# Patient Record
Sex: MALE | Race: BLACK OR AFRICAN AMERICAN | NOT HISPANIC OR LATINO | Employment: UNEMPLOYED | URBAN - METROPOLITAN AREA
[De-identification: names, ages, dates, MRNs, and addresses within clinical notes are randomized per-mention and may not be internally consistent; named-entity substitution may affect disease eponyms.]

---

## 2024-09-07 ENCOUNTER — HOSPITAL ENCOUNTER (INPATIENT)
Facility: HOSPITAL | Age: 29
End: 2024-09-07
Attending: STUDENT IN AN ORGANIZED HEALTH CARE EDUCATION/TRAINING PROGRAM | Admitting: STUDENT IN AN ORGANIZED HEALTH CARE EDUCATION/TRAINING PROGRAM
Payer: COMMERCIAL

## 2024-09-07 DIAGNOSIS — K50.914: ICD-10-CM

## 2024-09-07 DIAGNOSIS — K50.10 CROHN'S COLITIS, WITHOUT COMPLICATIONS (MULTI): Primary | ICD-10-CM

## 2024-09-07 DIAGNOSIS — K50.114 CROHN'S COLITIS, WITH ABSCESS (MULTI): ICD-10-CM

## 2024-09-07 LAB
BASOPHILS # BLD AUTO: 0.05 X10*3/UL (ref 0–0.1)
BASOPHILS NFR BLD AUTO: 0.4 %
EOSINOPHIL # BLD AUTO: 0.18 X10*3/UL (ref 0–0.7)
EOSINOPHIL NFR BLD AUTO: 1.5 %
ERYTHROCYTE [DISTWIDTH] IN BLOOD BY AUTOMATED COUNT: 11.8 % (ref 11.5–14.5)
HCT VFR BLD AUTO: 40.2 % (ref 41–52)
HGB BLD-MCNC: 14.2 G/DL (ref 13.5–17.5)
IMM GRANULOCYTES # BLD AUTO: 0.05 X10*3/UL (ref 0–0.7)
IMM GRANULOCYTES NFR BLD AUTO: 0.4 % (ref 0–0.9)
LYMPHOCYTES # BLD AUTO: 2.28 X10*3/UL (ref 1.2–4.8)
LYMPHOCYTES NFR BLD AUTO: 19.2 %
MCH RBC QN AUTO: 30.3 PG (ref 26–34)
MCHC RBC AUTO-ENTMCNC: 35.3 G/DL (ref 32–36)
MCV RBC AUTO: 86 FL (ref 80–100)
MONOCYTES # BLD AUTO: 1.35 X10*3/UL (ref 0.1–1)
MONOCYTES NFR BLD AUTO: 11.4 %
NEUTROPHILS # BLD AUTO: 7.94 X10*3/UL (ref 1.2–7.7)
NEUTROPHILS NFR BLD AUTO: 67.1 %
NRBC BLD-RTO: 0 /100 WBCS (ref 0–0)
PLATELET # BLD AUTO: 348 X10*3/UL (ref 150–450)
RBC # BLD AUTO: 4.68 X10*6/UL (ref 4.5–5.9)
WBC # BLD AUTO: 11.9 X10*3/UL (ref 4.4–11.3)

## 2024-09-07 PROCEDURE — 83690 ASSAY OF LIPASE: CPT | Performed by: EMERGENCY MEDICINE

## 2024-09-07 PROCEDURE — 99285 EMERGENCY DEPT VISIT HI MDM: CPT | Performed by: STUDENT IN AN ORGANIZED HEALTH CARE EDUCATION/TRAINING PROGRAM

## 2024-09-07 PROCEDURE — 86706 HEP B SURFACE ANTIBODY: CPT

## 2024-09-07 PROCEDURE — 85652 RBC SED RATE AUTOMATED: CPT

## 2024-09-07 PROCEDURE — 83690 ASSAY OF LIPASE: CPT | Performed by: STUDENT IN AN ORGANIZED HEALTH CARE EDUCATION/TRAINING PROGRAM

## 2024-09-07 PROCEDURE — 93010 ELECTROCARDIOGRAM REPORT: CPT | Performed by: STUDENT IN AN ORGANIZED HEALTH CARE EDUCATION/TRAINING PROGRAM

## 2024-09-07 PROCEDURE — 86803 HEPATITIS C AB TEST: CPT

## 2024-09-07 PROCEDURE — 96365 THER/PROPH/DIAG IV INF INIT: CPT

## 2024-09-07 PROCEDURE — 80053 COMPREHEN METABOLIC PANEL: CPT | Performed by: STUDENT IN AN ORGANIZED HEALTH CARE EDUCATION/TRAINING PROGRAM

## 2024-09-07 PROCEDURE — 86140 C-REACTIVE PROTEIN: CPT

## 2024-09-07 PROCEDURE — 85025 COMPLETE CBC W/AUTO DIFF WBC: CPT | Performed by: EMERGENCY MEDICINE

## 2024-09-07 PROCEDURE — 80053 COMPREHEN METABOLIC PANEL: CPT | Performed by: EMERGENCY MEDICINE

## 2024-09-07 PROCEDURE — 85025 COMPLETE CBC W/AUTO DIFF WBC: CPT | Performed by: STUDENT IN AN ORGANIZED HEALTH CARE EDUCATION/TRAINING PROGRAM

## 2024-09-07 PROCEDURE — 36415 COLL VENOUS BLD VENIPUNCTURE: CPT | Performed by: EMERGENCY MEDICINE

## 2024-09-07 PROCEDURE — 83735 ASSAY OF MAGNESIUM: CPT

## 2024-09-07 PROCEDURE — 99285 EMERGENCY DEPT VISIT HI MDM: CPT

## 2024-09-07 ASSESSMENT — COLUMBIA-SUICIDE SEVERITY RATING SCALE - C-SSRS
1. IN THE PAST MONTH, HAVE YOU WISHED YOU WERE DEAD OR WISHED YOU COULD GO TO SLEEP AND NOT WAKE UP?: NO
2. HAVE YOU ACTUALLY HAD ANY THOUGHTS OF KILLING YOURSELF?: NO
6. HAVE YOU EVER DONE ANYTHING, STARTED TO DO ANYTHING, OR PREPARED TO DO ANYTHING TO END YOUR LIFE?: NO

## 2024-09-07 ASSESSMENT — PAIN SCALES - GENERAL: PAINLEVEL_OUTOF10: 10 - WORST POSSIBLE PAIN

## 2024-09-07 ASSESSMENT — PAIN - FUNCTIONAL ASSESSMENT: PAIN_FUNCTIONAL_ASSESSMENT: 0-10

## 2024-09-07 ASSESSMENT — PAIN DESCRIPTION - LOCATION: LOCATION: ABDOMEN

## 2024-09-08 ENCOUNTER — APPOINTMENT (OUTPATIENT)
Dept: RADIOLOGY | Facility: HOSPITAL | Age: 29
End: 2024-09-08
Payer: COMMERCIAL

## 2024-09-08 ENCOUNTER — CLINICAL SUPPORT (OUTPATIENT)
Dept: EMERGENCY MEDICINE | Facility: HOSPITAL | Age: 29
End: 2024-09-08
Payer: COMMERCIAL

## 2024-09-08 VITALS
HEIGHT: 68 IN | DIASTOLIC BLOOD PRESSURE: 63 MMHG | OXYGEN SATURATION: 99 % | HEART RATE: 76 BPM | BODY MASS INDEX: 21.22 KG/M2 | WEIGHT: 140 LBS | SYSTOLIC BLOOD PRESSURE: 115 MMHG | RESPIRATION RATE: 18 BRPM | TEMPERATURE: 98.6 F

## 2024-09-08 PROBLEM — K50.10: Status: ACTIVE | Noted: 2024-09-08

## 2024-09-08 LAB
25(OH)D3 SERPL-MCNC: 22 NG/ML (ref 30–100)
ABO GROUP (TYPE) IN BLOOD: NORMAL
ALBUMIN SERPL BCP-MCNC: 3.6 G/DL (ref 3.4–5)
ALBUMIN SERPL BCP-MCNC: 3.9 G/DL (ref 3.4–5)
ALBUMIN SERPL BCP-MCNC: 4.2 G/DL (ref 3.4–5)
ALP SERPL-CCNC: 61 U/L (ref 33–120)
ALT SERPL W P-5'-P-CCNC: 5 U/L (ref 10–52)
ANION GAP SERPL CALC-SCNC: 14 MMOL/L (ref 10–20)
ANION GAP SERPL CALC-SCNC: 15 MMOL/L (ref 10–20)
ANION GAP SERPL CALC-SCNC: 18 MMOL/L (ref 10–20)
ANTIBODY SCREEN: NORMAL
AST SERPL W P-5'-P-CCNC: 12 U/L (ref 9–39)
ATRIAL RATE: 83 BPM
BACTERIA BLD CULT: NORMAL
BACTERIA BLD CULT: NORMAL
BASOPHILS # BLD AUTO: 0.04 X10*3/UL (ref 0–0.1)
BASOPHILS NFR BLD AUTO: 0.3 %
BILIRUB SERPL-MCNC: 0.5 MG/DL (ref 0–1.2)
BUN SERPL-MCNC: 5 MG/DL (ref 6–23)
BUN SERPL-MCNC: 6 MG/DL (ref 6–23)
BUN SERPL-MCNC: 7 MG/DL (ref 6–23)
C DIF TOX TCDA+TCDB STL QL NAA+PROBE: NOT DETECTED
CALCIUM SERPL-MCNC: 8.9 MG/DL (ref 8.6–10.6)
CALCIUM SERPL-MCNC: 9.6 MG/DL (ref 8.6–10.6)
CALCIUM SERPL-MCNC: 9.8 MG/DL (ref 8.6–10.6)
CHLORIDE SERPL-SCNC: 100 MMOL/L (ref 98–107)
CHLORIDE SERPL-SCNC: 101 MMOL/L (ref 98–107)
CHLORIDE SERPL-SCNC: 98 MMOL/L (ref 98–107)
CO2 SERPL-SCNC: 22 MMOL/L (ref 21–32)
CO2 SERPL-SCNC: 23 MMOL/L (ref 21–32)
CO2 SERPL-SCNC: 26 MMOL/L (ref 21–32)
CREAT SERPL-MCNC: 0.71 MG/DL (ref 0.5–1.3)
CREAT SERPL-MCNC: 0.78 MG/DL (ref 0.5–1.3)
CREAT SERPL-MCNC: 0.84 MG/DL (ref 0.5–1.3)
CRP SERPL-MCNC: 7.65 MG/DL
EGFRCR SERPLBLD CKD-EPI 2021: >90 ML/MIN/1.73M*2
EOSINOPHIL # BLD AUTO: 0.01 X10*3/UL (ref 0–0.7)
EOSINOPHIL NFR BLD AUTO: 0.1 %
ERYTHROCYTE [DISTWIDTH] IN BLOOD BY AUTOMATED COUNT: 11.7 % (ref 11.5–14.5)
ERYTHROCYTE [SEDIMENTATION RATE] IN BLOOD BY WESTERGREN METHOD: >130 MM/H (ref 0–15)
GLUCOSE BLD MANUAL STRIP-MCNC: 108 MG/DL (ref 74–99)
GLUCOSE BLD MANUAL STRIP-MCNC: 155 MG/DL (ref 74–99)
GLUCOSE SERPL-MCNC: 110 MG/DL (ref 74–99)
GLUCOSE SERPL-MCNC: 134 MG/DL (ref 74–99)
GLUCOSE SERPL-MCNC: 92 MG/DL (ref 74–99)
GRAM STN SPEC: ABNORMAL
GRAM STN SPEC: ABNORMAL
HBV SURFACE AB SER-ACNC: 43.6 MIU/ML
HBV SURFACE AG SERPL QL IA: NONREACTIVE
HCT VFR BLD AUTO: 33.5 % (ref 41–52)
HCV AB SER QL: NONREACTIVE
HGB BLD-MCNC: 11.9 G/DL (ref 13.5–17.5)
IMM GRANULOCYTES # BLD AUTO: 0.06 X10*3/UL (ref 0–0.7)
IMM GRANULOCYTES NFR BLD AUTO: 0.5 % (ref 0–0.9)
LIPASE SERPL-CCNC: 12 U/L (ref 9–82)
LYMPHOCYTES # BLD AUTO: 1.12 X10*3/UL (ref 1.2–4.8)
LYMPHOCYTES NFR BLD AUTO: 8.5 %
MAGNESIUM SERPL-MCNC: 1.78 MG/DL (ref 1.6–2.4)
MAGNESIUM SERPL-MCNC: 1.85 MG/DL (ref 1.6–2.4)
MAGNESIUM SERPL-MCNC: 2.05 MG/DL (ref 1.6–2.4)
MCH RBC QN AUTO: 29.8 PG (ref 26–34)
MCHC RBC AUTO-ENTMCNC: 35.5 G/DL (ref 32–36)
MCV RBC AUTO: 84 FL (ref 80–100)
MONOCYTES # BLD AUTO: 0.95 X10*3/UL (ref 0.1–1)
MONOCYTES NFR BLD AUTO: 7.2 %
NEUTROPHILS # BLD AUTO: 11.02 X10*3/UL (ref 1.2–7.7)
NEUTROPHILS NFR BLD AUTO: 83.4 %
NRBC BLD-RTO: 0 /100 WBCS (ref 0–0)
P AXIS: 76 DEGREES
P OFFSET: 214 MS
P ONSET: 171 MS
PHOSPHATE SERPL-MCNC: 3.4 MG/DL (ref 2.5–4.9)
PHOSPHATE SERPL-MCNC: 3.5 MG/DL (ref 2.5–4.9)
PLATELET # BLD AUTO: 309 X10*3/UL (ref 150–450)
POTASSIUM SERPL-SCNC: 3 MMOL/L (ref 3.5–5.3)
POTASSIUM SERPL-SCNC: 3 MMOL/L (ref 3.5–5.3)
POTASSIUM SERPL-SCNC: 3.2 MMOL/L (ref 3.5–5.3)
PR INTERVAL: 102 MS
PROT SERPL-MCNC: 9.3 G/DL (ref 6.4–8.2)
Q ONSET: 222 MS
QRS COUNT: 13 BEATS
QRS DURATION: 82 MS
QT INTERVAL: 388 MS
QTC CALCULATION(BAZETT): 455 MS
QTC FREDERICIA: 432 MS
R AXIS: 89 DEGREES
RBC # BLD AUTO: 3.99 X10*6/UL (ref 4.5–5.9)
RH FACTOR (ANTIGEN D): NORMAL
SODIUM SERPL-SCNC: 134 MMOL/L (ref 136–145)
SODIUM SERPL-SCNC: 136 MMOL/L (ref 136–145)
SODIUM SERPL-SCNC: 138 MMOL/L (ref 136–145)
T AXIS: -70 DEGREES
T OFFSET: 416 MS
VENTRICULAR RATE: 83 BPM
WBC # BLD AUTO: 13.2 X10*3/UL (ref 4.4–11.3)

## 2024-09-08 PROCEDURE — 96376 TX/PRO/DX INJ SAME DRUG ADON: CPT

## 2024-09-08 PROCEDURE — 74176 CT ABD & PELVIS W/O CONTRAST: CPT

## 2024-09-08 PROCEDURE — 80069 RENAL FUNCTION PANEL: CPT

## 2024-09-08 PROCEDURE — 2500000004 HC RX 250 GENERAL PHARMACY W/ HCPCS (ALT 636 FOR OP/ED)

## 2024-09-08 PROCEDURE — 2500000002 HC RX 250 W HCPCS SELF ADMINISTERED DRUGS (ALT 637 FOR MEDICARE OP, ALT 636 FOR OP/ED)

## 2024-09-08 PROCEDURE — 85025 COMPLETE CBC W/AUTO DIFF WBC: CPT

## 2024-09-08 PROCEDURE — 96375 TX/PRO/DX INJ NEW DRUG ADDON: CPT

## 2024-09-08 PROCEDURE — 36415 COLL VENOUS BLD VENIPUNCTURE: CPT

## 2024-09-08 PROCEDURE — 2500000002 HC RX 250 W HCPCS SELF ADMINISTERED DRUGS (ALT 637 FOR MEDICARE OP, ALT 636 FOR OP/ED): Mod: SE

## 2024-09-08 PROCEDURE — 82947 ASSAY GLUCOSE BLOOD QUANT: CPT

## 2024-09-08 PROCEDURE — 86850 RBC ANTIBODY SCREEN: CPT

## 2024-09-08 PROCEDURE — 93005 ELECTROCARDIOGRAM TRACING: CPT

## 2024-09-08 PROCEDURE — 83993 ASSAY FOR CALPROTECTIN FECAL: CPT

## 2024-09-08 PROCEDURE — 87340 HEPATITIS B SURFACE AG IA: CPT

## 2024-09-08 PROCEDURE — 83630 LACTOFERRIN FECAL (QUAL): CPT

## 2024-09-08 PROCEDURE — 2500000005 HC RX 250 GENERAL PHARMACY W/O HCPCS

## 2024-09-08 PROCEDURE — 87070 CULTURE OTHR SPECIMN AEROBIC: CPT

## 2024-09-08 PROCEDURE — 74176 CT ABD & PELVIS W/O CONTRAST: CPT | Performed by: RADIOLOGY

## 2024-09-08 PROCEDURE — 82306 VITAMIN D 25 HYDROXY: CPT

## 2024-09-08 PROCEDURE — 1100000001 HC PRIVATE ROOM DAILY

## 2024-09-08 PROCEDURE — 87506 IADNA-DNA/RNA PROBE TQ 6-11: CPT

## 2024-09-08 PROCEDURE — 87040 BLOOD CULTURE FOR BACTERIA: CPT

## 2024-09-08 PROCEDURE — 83735 ASSAY OF MAGNESIUM: CPT

## 2024-09-08 PROCEDURE — 2500000005 HC RX 250 GENERAL PHARMACY W/O HCPCS: Mod: SE

## 2024-09-08 PROCEDURE — 99223 1ST HOSP IP/OBS HIGH 75: CPT

## 2024-09-08 PROCEDURE — 86901 BLOOD TYPING SEROLOGIC RH(D): CPT

## 2024-09-08 PROCEDURE — 2500000001 HC RX 250 WO HCPCS SELF ADMINISTERED DRUGS (ALT 637 FOR MEDICARE OP)

## 2024-09-08 PROCEDURE — 87493 C DIFF AMPLIFIED PROBE: CPT

## 2024-09-08 PROCEDURE — 0D9Q0ZZ DRAINAGE OF ANUS, OPEN APPROACH: ICD-10-PCS

## 2024-09-08 PROCEDURE — 99255 IP/OBS CONSLTJ NEW/EST HI 80: CPT | Performed by: INTERNAL MEDICINE

## 2024-09-08 RX ORDER — ONDANSETRON HYDROCHLORIDE 2 MG/ML
4 INJECTION, SOLUTION INTRAVENOUS ONCE
Status: COMPLETED | OUTPATIENT
Start: 2024-09-08 | End: 2024-09-08

## 2024-09-08 RX ORDER — POTASSIUM CHLORIDE 14.9 MG/ML
20 INJECTION INTRAVENOUS
Status: DISCONTINUED | OUTPATIENT
Start: 2024-09-08 | End: 2024-09-08

## 2024-09-08 RX ORDER — ONDANSETRON HYDROCHLORIDE 2 MG/ML
4 INJECTION, SOLUTION INTRAVENOUS EVERY 4 HOURS PRN
Status: DISCONTINUED | OUTPATIENT
Start: 2024-09-08 | End: 2024-09-08

## 2024-09-08 RX ORDER — ONDANSETRON 4 MG/1
4 TABLET, FILM COATED ORAL EVERY 8 HOURS PRN
Status: DISCONTINUED | OUTPATIENT
Start: 2024-09-08 | End: 2024-09-08

## 2024-09-08 RX ORDER — CEFTRIAXONE 1 G/50ML
1 INJECTION, SOLUTION INTRAVENOUS ONCE
Status: COMPLETED | OUTPATIENT
Start: 2024-09-08 | End: 2024-09-08

## 2024-09-08 RX ORDER — PANTOPRAZOLE SODIUM 40 MG/10ML
80 INJECTION, POWDER, LYOPHILIZED, FOR SOLUTION INTRAVENOUS ONCE
Status: COMPLETED | OUTPATIENT
Start: 2024-09-08 | End: 2024-09-08

## 2024-09-08 RX ORDER — POTASSIUM CHLORIDE 1.5 G/1.58G
40 POWDER, FOR SOLUTION ORAL ONCE
Status: DISCONTINUED | OUTPATIENT
Start: 2024-09-08 | End: 2024-09-08

## 2024-09-08 RX ORDER — PANTOPRAZOLE SODIUM 40 MG/10ML
40 INJECTION, POWDER, LYOPHILIZED, FOR SOLUTION INTRAVENOUS
Status: DISCONTINUED | OUTPATIENT
Start: 2024-09-08 | End: 2024-09-08

## 2024-09-08 RX ORDER — POLYETHYLENE GLYCOL 3350, SODIUM CHLORIDE, SODIUM BICARBONATE, POTASSIUM CHLORIDE 420; 11.2; 5.72; 1.48 G/4L; G/4L; G/4L; G/4L
4000 POWDER, FOR SOLUTION ORAL ONCE
Status: COMPLETED | OUTPATIENT
Start: 2024-09-08 | End: 2024-09-08

## 2024-09-08 RX ORDER — METOCLOPRAMIDE HYDROCHLORIDE 5 MG/ML
10 INJECTION INTRAMUSCULAR; INTRAVENOUS ONCE
Status: COMPLETED | OUTPATIENT
Start: 2024-09-08 | End: 2024-09-08

## 2024-09-08 RX ORDER — INSULIN LISPRO 100 [IU]/ML
0-10 INJECTION, SOLUTION INTRAVENOUS; SUBCUTANEOUS
Status: DISCONTINUED | OUTPATIENT
Start: 2024-09-08 | End: 2024-09-08

## 2024-09-08 RX ORDER — POTASSIUM CHLORIDE 20 MEQ/1
40 TABLET, EXTENDED RELEASE ORAL ONCE
Status: COMPLETED | OUTPATIENT
Start: 2024-09-08 | End: 2024-09-08

## 2024-09-08 RX ORDER — ONDANSETRON HYDROCHLORIDE 2 MG/ML
4 INJECTION, SOLUTION INTRAVENOUS EVERY 6 HOURS PRN
Status: DISPENSED | OUTPATIENT
Start: 2024-09-08

## 2024-09-08 RX ORDER — PANTOPRAZOLE SODIUM 40 MG/10ML
40 INJECTION, POWDER, LYOPHILIZED, FOR SOLUTION INTRAVENOUS 2 TIMES DAILY
Status: DISPENSED | OUTPATIENT
Start: 2024-09-08

## 2024-09-08 RX ORDER — CEFTRIAXONE 1 G/50ML
1 INJECTION, SOLUTION INTRAVENOUS EVERY 24 HOURS
Status: DISPENSED | OUTPATIENT
Start: 2024-09-09

## 2024-09-08 RX ORDER — METRONIDAZOLE 500 MG/100ML
500 INJECTION, SOLUTION INTRAVENOUS EVERY 8 HOURS
Status: DISPENSED | OUTPATIENT
Start: 2024-09-08

## 2024-09-08 RX ORDER — POLYETHYLENE GLYCOL 3350, SODIUM CHLORIDE, SODIUM BICARBONATE, POTASSIUM CHLORIDE 420; 11.2; 5.72; 1.48 G/4L; G/4L; G/4L; G/4L
2000 POWDER, FOR SOLUTION ORAL ONCE AS NEEDED
Status: DISPENSED | OUTPATIENT
Start: 2024-09-08

## 2024-09-08 RX ORDER — POTASSIUM CHLORIDE 14.9 MG/ML
20 INJECTION INTRAVENOUS
Status: COMPLETED | OUTPATIENT
Start: 2024-09-08 | End: 2024-09-08

## 2024-09-08 RX ORDER — POLYETHYLENE GLYCOL 3350, SODIUM CHLORIDE, SODIUM BICARBONATE, POTASSIUM CHLORIDE 420; 11.2; 5.72; 1.48 G/4L; G/4L; G/4L; G/4L
4000 POWDER, FOR SOLUTION ORAL ONCE
Status: DISCONTINUED | OUTPATIENT
Start: 2024-09-08 | End: 2024-09-08

## 2024-09-08 RX ORDER — HYDROMORPHONE HYDROCHLORIDE 1 MG/ML
0.5 INJECTION, SOLUTION INTRAMUSCULAR; INTRAVENOUS; SUBCUTANEOUS ONCE
Status: COMPLETED | OUTPATIENT
Start: 2024-09-08 | End: 2024-09-08

## 2024-09-08 RX ORDER — MAGNESIUM SULFATE HEPTAHYDRATE 40 MG/ML
2 INJECTION, SOLUTION INTRAVENOUS ONCE
Status: COMPLETED | OUTPATIENT
Start: 2024-09-08 | End: 2024-09-08

## 2024-09-08 RX ORDER — POTASSIUM CHLORIDE 20 MEQ/1
20 TABLET, EXTENDED RELEASE ORAL ONCE
Status: DISCONTINUED | OUTPATIENT
Start: 2024-09-08 | End: 2024-09-08

## 2024-09-08 RX ORDER — LIDOCAINE HYDROCHLORIDE AND EPINEPHRINE 10; 10 MG/ML; UG/ML
20 INJECTION, SOLUTION INFILTRATION; PERINEURAL ONCE
Status: COMPLETED | OUTPATIENT
Start: 2024-09-08 | End: 2024-09-08

## 2024-09-08 RX ADMIN — POTASSIUM CHLORIDE 20 MEQ: 14.9 INJECTION, SOLUTION INTRAVENOUS at 12:01

## 2024-09-08 RX ADMIN — LIDOCAINE HYDROCHLORIDE,EPINEPHRINE BITARTRATE 20 ML: 10; .01 INJECTION, SOLUTION INFILTRATION; PERINEURAL at 03:57

## 2024-09-08 RX ADMIN — ONDANSETRON 4 MG: 2 INJECTION INTRAMUSCULAR; INTRAVENOUS at 01:30

## 2024-09-08 RX ADMIN — INSULIN LISPRO 2 UNITS: 100 INJECTION, SOLUTION INTRAVENOUS; SUBCUTANEOUS at 08:08

## 2024-09-08 RX ADMIN — HYDROMORPHONE HYDROCHLORIDE 0.5 MG: 1 INJECTION, SOLUTION INTRAMUSCULAR; INTRAVENOUS; SUBCUTANEOUS at 03:27

## 2024-09-08 RX ADMIN — METRONIDAZOLE 500 MG: 5 INJECTION, SOLUTION INTRAVENOUS at 06:38

## 2024-09-08 RX ADMIN — POLYETHYLENE GLYCOL 3350, SODIUM SULFATE ANHYDROUS, SODIUM BICARBONATE, SODIUM CHLORIDE, POTASSIUM CHLORIDE 4000 ML: 236; 22.74; 6.74; 5.86; 2.97 POWDER, FOR SOLUTION ORAL at 19:20

## 2024-09-08 RX ADMIN — HYDROMORPHONE HYDROCHLORIDE 0.5 MG: 1 INJECTION, SOLUTION INTRAMUSCULAR; INTRAVENOUS; SUBCUTANEOUS at 01:30

## 2024-09-08 RX ADMIN — POTASSIUM CHLORIDE 20 MEQ: 14.9 INJECTION, SOLUTION INTRAVENOUS at 09:58

## 2024-09-08 RX ADMIN — ONDANSETRON 4 MG: 2 INJECTION INTRAMUSCULAR; INTRAVENOUS at 03:27

## 2024-09-08 RX ADMIN — PANTOPRAZOLE SODIUM 80 MG: 40 INJECTION, POWDER, FOR SOLUTION INTRAVENOUS at 08:07

## 2024-09-08 RX ADMIN — METHYLPREDNISOLONE SODIUM SUCCINATE 20 MG: 125 INJECTION INTRAMUSCULAR; INTRAVENOUS at 06:47

## 2024-09-08 RX ADMIN — METRONIDAZOLE 500 MG: 5 INJECTION, SOLUTION INTRAVENOUS at 12:01

## 2024-09-08 RX ADMIN — METOCLOPRAMIDE 10 MG: 5 INJECTION, SOLUTION INTRAMUSCULAR; INTRAVENOUS at 06:47

## 2024-09-08 RX ADMIN — CEFTRIAXONE SODIUM 1 G: 1 INJECTION, SOLUTION INTRAVENOUS at 04:59

## 2024-09-08 RX ADMIN — METHYLPREDNISOLONE SODIUM SUCCINATE 20 MG: 125 INJECTION INTRAMUSCULAR; INTRAVENOUS at 21:40

## 2024-09-08 RX ADMIN — POTASSIUM CHLORIDE 40 MEQ: 1500 TABLET, EXTENDED RELEASE ORAL at 02:29

## 2024-09-08 RX ADMIN — MAGNESIUM SULFATE HEPTAHYDRATE 2 G: 40 INJECTION, SOLUTION INTRAVENOUS at 07:38

## 2024-09-08 RX ADMIN — ONDANSETRON 4 MG: 2 INJECTION INTRAMUSCULAR; INTRAVENOUS at 20:08

## 2024-09-08 RX ADMIN — PANTOPRAZOLE SODIUM 40 MG: 40 INJECTION, POWDER, FOR SOLUTION INTRAVENOUS at 20:08

## 2024-09-08 RX ADMIN — ONDANSETRON HYDROCHLORIDE 4 MG: 4 TABLET, FILM COATED ORAL at 13:06

## 2024-09-08 RX ADMIN — METRONIDAZOLE 500 MG: 5 INJECTION, SOLUTION INTRAVENOUS at 20:28

## 2024-09-08 RX ADMIN — SODIUM CHLORIDE, POTASSIUM CHLORIDE, SODIUM LACTATE AND CALCIUM CHLORIDE 1000 ML: 600; 310; 30; 20 INJECTION, SOLUTION INTRAVENOUS at 04:58

## 2024-09-08 RX ADMIN — METHYLPREDNISOLONE SODIUM SUCCINATE 20 MG: 125 INJECTION INTRAMUSCULAR; INTRAVENOUS at 13:06

## 2024-09-08 SDOH — SOCIAL STABILITY: SOCIAL INSECURITY: HAVE YOU HAD ANY THOUGHTS OF HARMING ANYONE ELSE?: NO

## 2024-09-08 SDOH — SOCIAL STABILITY: SOCIAL INSECURITY: ARE THERE ANY APPARENT SIGNS OF INJURIES/BEHAVIORS THAT COULD BE RELATED TO ABUSE/NEGLECT?: NO

## 2024-09-08 SDOH — SOCIAL STABILITY: SOCIAL INSECURITY: ARE YOU OR HAVE YOU BEEN THREATENED OR ABUSED PHYSICALLY, EMOTIONALLY, OR SEXUALLY BY ANYONE?: NO

## 2024-09-08 SDOH — SOCIAL STABILITY: SOCIAL INSECURITY: ABUSE: ADULT

## 2024-09-08 SDOH — SOCIAL STABILITY: SOCIAL INSECURITY: HAS ANYONE EVER THREATENED TO HURT YOUR FAMILY OR YOUR PETS?: NO

## 2024-09-08 SDOH — SOCIAL STABILITY: SOCIAL INSECURITY: WERE YOU ABLE TO COMPLETE ALL THE BEHAVIORAL HEALTH SCREENINGS?: YES

## 2024-09-08 SDOH — SOCIAL STABILITY: SOCIAL INSECURITY: DO YOU FEEL UNSAFE GOING BACK TO THE PLACE WHERE YOU ARE LIVING?: NO

## 2024-09-08 SDOH — ECONOMIC STABILITY: INCOME INSECURITY: HOW HARD IS IT FOR YOU TO PAY FOR THE VERY BASICS LIKE FOOD, HOUSING, MEDICAL CARE, AND HEATING?: HARD

## 2024-09-08 SDOH — HEALTH STABILITY: MENTAL HEALTH: HOW OFTEN DO YOU HAVE 6 OR MORE DRINKS ON ONE OCCASION?: NEVER

## 2024-09-08 SDOH — HEALTH STABILITY: MENTAL HEALTH: HOW OFTEN DO YOU HAVE A DRINK CONTAINING ALCOHOL?: NEVER

## 2024-09-08 SDOH — SOCIAL STABILITY: SOCIAL INSECURITY: DOES ANYONE TRY TO KEEP YOU FROM HAVING/CONTACTING OTHER FRIENDS OR DOING THINGS OUTSIDE YOUR HOME?: NO

## 2024-09-08 SDOH — ECONOMIC STABILITY: INCOME INSECURITY: IN THE LAST 12 MONTHS, WAS THERE A TIME WHEN YOU WERE NOT ABLE TO PAY THE MORTGAGE OR RENT ON TIME?: YES

## 2024-09-08 SDOH — SOCIAL STABILITY: SOCIAL INSECURITY: DO YOU FEEL ANYONE HAS EXPLOITED OR TAKEN ADVANTAGE OF YOU FINANCIALLY OR OF YOUR PERSONAL PROPERTY?: NO

## 2024-09-08 SDOH — SOCIAL STABILITY: SOCIAL INSECURITY: HAVE YOU HAD THOUGHTS OF HARMING ANYONE ELSE?: NO

## 2024-09-08 SDOH — HEALTH STABILITY: MENTAL HEALTH: HOW MANY STANDARD DRINKS CONTAINING ALCOHOL DO YOU HAVE ON A TYPICAL DAY?: PATIENT DOES NOT DRINK

## 2024-09-08 SDOH — ECONOMIC STABILITY: HOUSING INSECURITY: AT ANY TIME IN THE PAST 12 MONTHS, WERE YOU HOMELESS OR LIVING IN A SHELTER (INCLUDING NOW)?: NO

## 2024-09-08 SDOH — ECONOMIC STABILITY: HOUSING INSECURITY: IN THE PAST 12 MONTHS, HOW MANY TIMES HAVE YOU MOVED WHERE YOU WERE LIVING?: 2

## 2024-09-08 SDOH — ECONOMIC STABILITY: TRANSPORTATION INSECURITY
IN THE PAST 12 MONTHS, HAS THE LACK OF TRANSPORTATION KEPT YOU FROM MEDICAL APPOINTMENTS OR FROM GETTING MEDICATIONS?: NO

## 2024-09-08 SDOH — ECONOMIC STABILITY: TRANSPORTATION INSECURITY
IN THE PAST 12 MONTHS, HAS LACK OF TRANSPORTATION KEPT YOU FROM MEETINGS, WORK, OR FROM GETTING THINGS NEEDED FOR DAILY LIVING?: NO

## 2024-09-08 ASSESSMENT — ACTIVITIES OF DAILY LIVING (ADL)
PATIENT'S MEMORY ADEQUATE TO SAFELY COMPLETE DAILY ACTIVITIES?: YES
HEARING - LEFT EAR: FUNCTIONAL
DRESSING YOURSELF: INDEPENDENT
ADEQUATE_TO_COMPLETE_ADL: YES
BATHING: INDEPENDENT
HEARING - RIGHT EAR: FUNCTIONAL
TOILETING: INDEPENDENT
WALKS IN HOME: INDEPENDENT
FEEDING YOURSELF: INDEPENDENT
JUDGMENT_ADEQUATE_SAFELY_COMPLETE_DAILY_ACTIVITIES: YES
GROOMING: INDEPENDENT

## 2024-09-08 ASSESSMENT — COGNITIVE AND FUNCTIONAL STATUS - GENERAL
MOBILITY SCORE: 24
PATIENT BASELINE BEDBOUND: NO
MOBILITY SCORE: 24
DAILY ACTIVITIY SCORE: 24
DAILY ACTIVITIY SCORE: 24

## 2024-09-08 ASSESSMENT — LIFESTYLE VARIABLES
HOW MANY STANDARD DRINKS CONTAINING ALCOHOL DO YOU HAVE ON A TYPICAL DAY: PATIENT DOES NOT DRINK
HOW OFTEN DO YOU HAVE 6 OR MORE DRINKS ON ONE OCCASION: NEVER
SKIP TO QUESTIONS 9-10: 1
AUDIT-C TOTAL SCORE: 0
SKIP TO QUESTIONS 9-10: 1
HOW OFTEN DO YOU HAVE A DRINK CONTAINING ALCOHOL: NEVER
SKIP TO QUESTIONS 9-10: 1
AUDIT-C TOTAL SCORE: 0
HOW OFTEN DO YOU HAVE A DRINK CONTAINING ALCOHOL: NEVER
AUDIT-C TOTAL SCORE: 0
HOW OFTEN DO YOU HAVE 6 OR MORE DRINKS ON ONE OCCASION: NEVER
HOW MANY STANDARD DRINKS CONTAINING ALCOHOL DO YOU HAVE ON A TYPICAL DAY: PATIENT DOES NOT DRINK

## 2024-09-08 ASSESSMENT — PAIN SCALES - GENERAL
PAINLEVEL_OUTOF10: 9
PAINLEVEL_OUTOF10: 0 - NO PAIN
PAINLEVEL_OUTOF10: 9
PAINLEVEL_OUTOF10: 9
PAINLEVEL_OUTOF10: 0 - NO PAIN

## 2024-09-08 ASSESSMENT — PATIENT HEALTH QUESTIONNAIRE - PHQ9
2. FEELING DOWN, DEPRESSED OR HOPELESS: NOT AT ALL
2. FEELING DOWN, DEPRESSED OR HOPELESS: NOT AT ALL
SUM OF ALL RESPONSES TO PHQ9 QUESTIONS 1 & 2: 0
1. LITTLE INTEREST OR PLEASURE IN DOING THINGS: NOT AT ALL
SUM OF ALL RESPONSES TO PHQ9 QUESTIONS 1 & 2: 0
1. LITTLE INTEREST OR PLEASURE IN DOING THINGS: NOT AT ALL

## 2024-09-08 ASSESSMENT — PAIN - FUNCTIONAL ASSESSMENT: PAIN_FUNCTIONAL_ASSESSMENT: 0-10

## 2024-09-08 NOTE — ED NOTES
Orthostatic VS  Laying: HR 78. /83  Sitting: HR 92. /84  Standing: HR 93 /96       Mi Rosado RN  09/08/24 110

## 2024-09-08 NOTE — PROGRESS NOTES
Pharmacy Medication History Review    Dave Alfaro is a 29 y.o. male admitted for Crohn's colitis, without complications (Multi). Pharmacy reviewed the patient's [id number] medications and allergies for accuracy.    Medications ADDED:  None   Medications CHANGED:  None   Medications REMOVED:   None      The list below reflects the updated PTA list. Comments regarding how patient may be taking medications differently can be found in the Admit Orders Activity  Prior to Admission Medications   Prescriptions Last Dose Informant   adalimumab (Humira,CF, Pen) 40 mg/0.4 mL pen injector kit pen-injector  Self   Sig: Inject under the skin.      Facility-Administered Medications: None        The list below reflects the updated allergy list. Please review each documented allergy for additional clarification and justification.  Allergies  Reviewed by Xiomara Cintron on 9/8/2024        Severity Reactions Comments    Iodinated Contrast Media Not Specified Hives     Iodine Not Specified Hives, Swelling     Methotrexate Not Specified Unknown     Pramoxine-hc-chloroxylenol Not Specified Unknown     Red Dye Not Specified Hives     Salicylates Not Specified Other Pt. Reports causes bleeding issues    Sulfasalazine Low Rash, Swelling             Patient accepts M2B at discharge. Pharmacy has been updated to [facility].    Sources used to complete the med history include:  OARRS  Interview with Patient (Good Historian)  Care Everywhere reviewed      Below are additional concerns with the patient's PTA list:  Patient stated he has not HUMIRA in about 7 months.    Xiomara Cintron [facility]  Transitions of Care Technician  [facility]  Please reach out via Secure Chat for questions, or if no response call [employer] or [facility]

## 2024-09-08 NOTE — PROCEDURES
[facility]  DEPARTMENT OF SURGERY    PROCEDURE NOTE    PROCEDURE:  Incision and drainage of asuncion-anal abscess    INDICATION:  Asuncion-rectal abscess    PERFORMING PHYSICIAN:  Juanita Coffey MD    ASSISTING PHYSICIAN:  Flako Michael MD    CONSENT:  The procedure, risks, benefits and alternatives were discussed with the patient. He is in understanding and agrees with proceeding. Verbal and written consent were obtained.    ESTIMATED BLOOD LOSS:  1 cc    ANESTHESIA:  none    PROCEDURE DETAILS:  The patient was placed in the left lateral position. The right buttock was cleaned and draped in the usual fashion. The area was infiltrated with 4ccs of 1% lidocaine with epinephrine. A cruciate incision was made over the area of central fluctuance with expression of a moderate amount of thin purulent fluid.  A tissue culture was obtained. The area was probed and deloculated. The incision was dressed with 4x4s. The patient tolerated the procedure well.     COMPLICATIONS:  None    PATIENT CONDITION:  Tolerated well

## 2024-09-08 NOTE — CONSULTS
Inpatient consult to Gastroenterology  Consult performed by: Billy Coburn MD  Consult ordered by: Gladys Hopkins DO          [facility]   [facility]  INITIAL CONSULT NOTE     Source of Information: The source of the history was patient    Consult requested by: Service: Carli Team    Reason for Consult: nausea, vomiting, hematochezia    Admission Chief Complaint: nausea, vomiting, hematochezia      SUBJECTIVE     HPI: Dave Alfaro is a 29 y.o. male w/PMH of Crohn's disease c/b sacroiliitis previously on adalimumab (last dose about 7 months ago due to non-adherence), BPD, DELMIS, vitamin D deficiency who presents for nausea, emesis, diarrhea, and hematochezia. Gastroenterology has been consulted for evaluation of the same.    The patient states feeling well until experiencing asuncion-anal pain about one week ago and noticing several abscesses forming. Notes some chills but never measured his temperature. Additionally noticed increased joint pain at this time in his RT shoulder, hips, and knees.    Additionally he had sudden onset nausea yesterday with at first bilious emesis x 5 that eventually started to also contain specks of blood. Non-radiating LLQ pain also started around this time and was associated with diarrhea and BRBPR. He had one episode of BRBPR at home and one in the ED, both painless. Otherwise denies dysphagia, odynophagia, tenesmus, obstipation/constipation. The BRBPR is what prompted him to seek evaluation in the ED.    He denies NSAID, alcohol, and tobacco use.    The patient was first diagnosed with Crohn's disease in 2011 and previously followed with Dr. Ibarra but reports recent non-adherence due to some mistrust of hospitals and doctors in general without endorsing specific concern. He was originally managed with infliximab but developed antibodies. The patient was then transitioned to adalimumab in 2012. At times he was also on 6-MP and then  methotrexate, however, these were discontinued due to inefficacy. 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 Last IBD ED/hospitalization in 2020 for LLQ/nausea/emesis and URI symptoms. CT showed Circumferential mucosal wall thickening of the large bowel from the level of the splenic flexure to the sigmoid colon with large amount of colonic fecal material at the level of the rectosigmoid as well as some mild mesenteric lymphadenopathy. Last clinic visit with Dr. Ibarra 2/23/21 where he did not report significant GI symptoms. Plan was to continue adalimumab and evaluated disease activity with EGD/colonoscopy (does not appear to have undergone per chart review). Last documented colonoscopy 2014 which showed inflammation from hepatic flexure to cecum.    ROS: Complete review of systems obtained, negative unless otherwise indicated above.     Allergies   Allergen Reactions    Iodinated Contrast Media Hives    Iodine Hives and Swelling    Methotrexate Unknown    Pramoxine-Hc-Chloroxylenol Unknown    Red Dye Hives    Salicylates Other     Pt. Reports causes bleeding issues    Sulfasalazine Rash and Swelling     Past Medical History:   Diagnosis Date    Crohn's disease, unspecified, without complications (Multi)     Crohn's disease    Hemorrhage of anus and rectum     Hemorrhage of rectum and anus    Melena     Hematochezia    Other conditions influencing health status     Arthritis    Other conditions influencing health status     Arthritis Of Inflammatory Bowel Disease    Pain in unspecified joint     Joint pain    Personal history of diseases of the blood and blood-forming organs and certain disorders involving the immune mechanism     History of iron deficiency anemia    Personal history of other diseases of the musculoskeletal system and connective tissue     History of backache    Personal history of other specified conditions     History of dizziness    Personal history of other  specified conditions     History of abdominal pain    Sacroiliitis, not elsewhere classified (CMS-HCC)     Sacroiliitis    Unspecified abdominal hernia without obstruction or gangrene     Hernia     Past Surgical History:   Procedure Laterality Date    COLONOSCOPY  12/11/2013    Complete Colonoscopy    HERNIA REPAIR  01/17/2014    Inguinal Hernia Repair    KNEE ARTHROSCOPY W/ DEBRIDEMENT  01/17/2014    Knee Arthroscopy (Therapeutic)    LYMPH NODE BIOPSY  04/08/2014    Biopsy Lymph Node    OTHER SURGICAL HISTORY  01/17/2014    Colonoscopy (Fiberoptic) Forceps Biopsy     No family history on file.  Social History     Social History Narrative    Not on file     [unfilled]    Medications:  Scheduled medications  [START ON 9/9/2024] cefTRIAXone, 1 g, intravenous, q24h  insulin lispro, 0-10 Units, subcutaneous, TID  methylPREDNISolone sodium succinate (PF), 20 mg, intravenous, q8h  metroNIDAZOLE, 500 mg, intravenous, q8h  pantoprazole, 40 mg, intravenous, BID  potassium chloride, 20 mEq, intravenous, q2h      Continuous medications     PRN medications         EXAM     Vital signs:  Visit Vitals  /71   Pulse 77   Temp 36.8 °C (98.2 °F) (Temporal)   Resp (!) 22         Physical Exam  Vitals reviewed.   Constitutional:       General: He is not in acute distress.     Appearance: Normal appearance. He is not ill-appearing or toxic-appearing.   HENT:      Head: Normocephalic and atraumatic.      Nose: Nose normal.   Eyes:      General: No scleral icterus.     Conjunctiva/sclera: Conjunctivae normal.      Pupils: Pupils are equal, round, and reactive to light.   Cardiovascular:      Rate and Rhythm: Normal rate and regular rhythm.      Pulses: Normal pulses.   Pulmonary:      Effort: Pulmonary effort is normal. No respiratory distress.   Abdominal:      General: Abdomen is flat. There is no distension.      Palpations: Abdomen is soft. There is no mass.      Tenderness: There is no abdominal tenderness. There is no guarding  or rebound.   Skin:     General: Skin is warm and dry.      Capillary Refill: Capillary refill takes less than 2 seconds.   Neurological:      Mental Status: He is alert and oriented to person, place, and time. Mental status is at baseline.             DATA                                                                            Labs     Lab Results   Component Value Date    WBC 13.2 (H) 09/08/2024    WBC 11.9 (H) 09/07/2024    WBC 6.0 12/13/2022    HGB 11.9 (L) 09/08/2024    HGB 14.2 09/07/2024    HGB 13.8 12/13/2022    MCV 84 09/08/2024    MCV 86 09/07/2024    MCV 93 12/13/2022     09/08/2024     09/07/2024     12/13/2022       Lab Results   Component Value Date    GLUCOSE 110 (H) 09/08/2024    CALCIUM 9.6 09/08/2024     09/08/2024    K 3.0 (L) 09/08/2024    CO2 22 09/08/2024     09/08/2024    BUN 6 09/08/2024    CREATININE 0.71 09/08/2024       Lab Results   Component Value Date    ALT 5 (L) 09/07/2024    ALT 12 12/13/2022    ALT 10 03/03/2021    AST 12 09/07/2024    AST 16 12/13/2022    AST 15 03/03/2021    ALKPHOS 61 09/07/2024    ALKPHOS 45 12/13/2022    ALKPHOS 42 03/03/2021    BILITOT 0.5 09/07/2024    BILITOT 0.6 12/13/2022    BILITOT 0.6 03/03/2021                                                                                  Imaging             === 09/07/24 ===    CT ABDOMEN PELVIS WO IV CONTRAST    - Impression -  There is colonic wall thickening extending from the proximal  ascending colon to the distal descending colon with mild pericolonic  inflammatory stranding. Findings concerning for  infectious/inflammatory colitis. No pneumatosis or portal venous gas.  No evidence for free air as clinically question. There is asymmetric  soft tissue thickening along the right medial intergluteal fold with  question some subtle adjacent stranding. Evaluation is limited on  this unenhanced CT. Given patient's history of Crohn's disease  findings concerning for a fistulous tract  with developing phlegmonous  changes. Correlate with symptomatology and dedicated exam.                                                                           GI Procedures   Colonoscopy 2014:   Impression:            - The examined portion of the ileum was normal.                          Biopsied.                         - Inflammation was found from the hepatic flexure to                          the cecum secondary to Crohn's disease with colonic                          involvement. Biopsied.                         - Inflammation was found in the transverse colon.                          Biopsied.                         - The rectum, sigmoid colon, descending colon and                          splenic flexure are normal. Biopsied.    A. TERMINAL ILEUM, COLD BIOPSY:  -- SMALL BOWEL MUCOSA WITH LYMPHOID AGGREGATES, NO SIGNIFICANT PATHOLOGIC  FINDINGS.     B.  CECUM, COLD BIOPSY:  -- ULCER AND CHRONIC ACTIVE COLITIS.    Note: Focal epithelial atypia is present, and is favored as reactive in nature  due to prominent inflammatory background.     C.  ASCENDING COLON, COLD BIOPSY:  -- CHRONIC ACTIVE COLITIS.     D.  TRANSVERSE, PROXIMAL & DISTAL COLD BIOPSY:  -- CHRONIC ACTIVE COLITIS.     E.  DESCENDING COLON, COLD BIOPSY:  -- CHRONIC INACTIVE COLITIS.     F.  SIGMOID COLON, COLD BIOPSY:  -- CHRONIC INACTIVE COLITIS.     G.  RECTUM, COLD BIOPSY:    -- CHRONIC INACTIVE COLITIS.     ASSESSMENT / PLAN                  Assessment and Recommendations:   Dave Alfaro is a 29 y.o. male w/PMH of Crohn's disease c/b sacroiliitis previously on adalimumab (last dose about 7 months ago due to non-adherence), BPD, DELMIS, vitamin D deficiency who presents for nausea, emesis, diarrhea, and hematochezia. Gastroenterology has been consulted for evaluation of Crohn's disease flare.    The patient is currently afebrile and hemodynamically stable. He does have an acute on chronic anemia but has not required transfusion. His  symptoms are most consistent with a flare of his known Crohn's disease, likely precipitated by medication non-adherence due to poor compliance with follow-up. Stool pathogen workup negative. Hematemesis most likely related to a MW tear though possibly esophagitis, gastritis, PUD as well. His presentation is further complicated by worsening MSK and asuncion-anal disease that are likely related to his flare. Asuncion-anal abscesses now s/p ID by General Surgery with Colorectal Surgery service to follow.    #Hematemesis  #Hematochezia  #Asuncion-anal Ileocolonic Crohn's Disease, acute flare  #Acute on Chronic Normocytic Anemia    Recommendations:  - Trend CBC q12h, transfuse for Hgb < 7.0 or platelets < 50k or in the setting of hemodynamic instability in the setting of active hemorrhage.  - Will follow-up on fecal calprotectin  - Please order MRI A/P to evaluate for possible fistula in the setting of asuncion-anal disease  - Continue methylprednisolone 20 mg IV TID  - Continue antibiotics per Primary Team  - Clear liquid diet today  - Please make the patient NPO at [id number] 9/8/24   - Will plan for EGD and colonoscopy 9/9/24 to assess disease activity and sources of hematemesis/hematochezia; please order 4L of golytely for bowel prep to be administered with split dosing  - Agree with Colorectal Surgery consultation    For Bowel Prep:  -Clear liquid diet for the rest of today  -NPO at MN except for bowel prep  - Please administer 4L Golytely this evening starting at 5pm. 2L PM 9/8 and 2L AM 9/9.  - Patient must drink all 4 L prep. Prep tastes better cold, mixed with other clear/flavorful beverage such as crystal light or juice. Pt to drink 8 oz glass of Golytely mixture every 15 minutes until ALL liquid is gone.   - Can mix Golytley with ice, juice (non-red), and drink with straw.  - Rectal output should be CLEAR (appearance of water or urine).  - If the patient's BMs are not clear by 4 AM, nursing should contact on-call primary team  cross-cover to order 2 additional liters of Golytely.   - Pt must drink these 2 L by 6 AM, otherwise colonoscopy may need to be postponed    JAIRO per primary team.   ------------------------------------------------------------------------  Billy Coburn MD   Gastroenterology Fellow    After 5PM and on Weekends, please page on-call fellow.    Case discussed with on-call attending Dr. Vamshi Guillen and will be formally seen by GI consults attending Dr. Marco Hemphill 9/9/24.  Final recommendations pending attending attestation.

## 2024-09-08 NOTE — H&P
History Of Present Illness  Dave Alfaro is a 29 y.o. male with PMH Crohn's disease and arthritis presenting with a chief complaint of rectal bleeding.    On interview, patient notes a history of abdominal pain, nausea, vomiting, and bloody BMs that started in the last 4-5 days. Patient notes diarrhea that started a few days ago which gradually became bloody, pt had 2 bloody BMs prior to arrival to the ED. Abdominal pain is diffuse but most painful in LLQ, patient also endorses rectal pain. Notes pain is similar to previous Crohn's disease flares, follows with Dr. Ibarra outpatient but has not been able to receive Humira in 7-8 months after missing appointments. Patient reports he had a colonoscopy 2-3 years ago but last documentation indicates colonoscopy in 2014 per chart review. Patient also notes right sided chest pain that extends to his right shoulder and worsens with movement, patient believes pain is related to chronic untreated rotator cuff injury. Patient otherwise denies hemoptysis, hematemesis, recent fevers/chills, shortness of breath, urinary changes, dizziness, lightheadedness.    In the ED, patient was treated for suspected Crohn's flare. IV methylprednisolone 20mg q8 started, IV protonix 80mg given. 1L LR bolus given persistent diarrhea and fluid losses. 0.5mg IV Dilaudid for pain control. Patient had an additional bright red bloody BM in the ED (see media). CT A/P noted likely fistulous tract formation along right intergluteal fold. ACS was consulted for perirectal/perianal abscess noted on exam, performed I&D which relieved rectal pain. Patient started on ceftriaxone and metronidazole.     Past Medical History  He has a past medical history of Crohn's disease, unspecified, without complications (Multi), Hemorrhage of anus and rectum, Melena, Other conditions influencing health status, Other conditions influencing health status, Pain in unspecified joint, Personal history of diseases of the blood  and blood-forming organs and certain disorders involving the immune mechanism, Personal history of other diseases of the musculoskeletal system and connective tissue, Personal history of other specified conditions, Personal history of other specified conditions, Sacroiliitis, not elsewhere classified (CMS-HCC), and Unspecified abdominal hernia without obstruction or gangrene.    Surgical History  He has a past surgical history that includes Colonoscopy (12/11/2013); Knee arthroscopy w/ debridement (01/17/2014); Lymph node biopsy (04/08/2014); Hernia repair (01/17/2014); and Other surgical history (01/17/2014).     Social History  He has no history on file for tobacco use, alcohol use, and drug use.    Family History  No family history on file.     Allergies  Iodinated contrast media, Iodine, Methotrexate, Pramoxine-hc-chloroxylenol, Red dye, Salicylates, and Sulfasalazine    Last Recorded Vitals  Vitals:    09/08/24 0858 09/08/24 1100 09/08/24 1159 09/08/24 1200   BP: 115/72 127/83 (!) 142/96 123/71   BP Location:       Patient Position:       Pulse: 79 87 94 77   Resp: (!) 23 20 18 (!) 22   Temp:   36.8 °C (98.2 °F)    TempSrc:   Temporal    SpO2: 99% 99% 100% 99%   Weight:       Height:           Intake/Output last 3 Shifts:  No intake/output data recorded.    Physical Exam  General: Appears uncomfortable. Appears stated age. In no acute distress   HEENT: Normocephalic and atraumatic. EOMI, sclera non-icteric, MMM, no JVD  Cardiovascular: RRR, no r/m/g, right chest point tenderness  Pulmonary: Lungs clear to auscultation bilaterally. No wheezes/ rhonchi/ rales   GI: +BS, soft, tender to palpation most prominent in suprapubic area, no rebound or guarding  : Suprapubic pain  Extremities: No LE edema, limited RUE movement   Skin: warm and dry. No rashes or lesions   Neurologic: No focal neurologic deficit   Psych: Pleasant. Appropriate mood and affect     Labs    CBC  Results from last 72 hours   Lab Units  09/08/24  0702 09/07/24  2321   WBC AUTO x10*3/uL 13.2* 11.9*   HEMOGLOBIN g/dL 11.9* 14.2   HEMATOCRIT % 33.5* 40.2*   PLATELETS AUTO x10*3/uL 309 348      BMP  Results from last 72 hours   Lab Units 09/08/24  0523 09/07/24  2321   SODIUM mmol/L 138 136   POTASSIUM mmol/L 3.0* 3.2*   CHLORIDE mmol/L 101 98   BUN mg/dL 6 5*   CREATININE mg/dL 0.71 0.84   MAGNESIUM mg/dL 1.78 1.85   PHOSPHORUS mg/dL 3.5  --      LFTS  Results from last 72 hours   Lab Units 09/07/24  2321   ALT U/L 5*   AST U/L 12   ALK PHOS U/L 61   BILIRUBIN TOTAL mg/dL 0.5     Blood cultures 9/8/24: pending  Vitamin D: 22 (low)  Calprotectin: pending  Lactoferrin: pending  CRP: 7.65  ESR: >130    Imaging  CT abdomen pelvis wo IV contrast    Result Date: 9/8/2024  There is colonic wall thickening extending from the proximal ascending colon to the distal descending colon with mild pericolonic inflammatory stranding. Findings concerning for infectious/inflammatory colitis. No pneumatosis or portal venous gas. No evidence for free air as clinically question. There is asymmetric soft tissue thickening along the right medial intergluteal fold with question some subtle adjacent stranding. Evaluation is limited on this unenhanced CT. Given patient's history of Crohn's disease findings concerning for a fistulous tract with developing phlegmonous changes. Correlate with symptomatology and dedicated exam.   Additional findings as described above.   MACRO: None   Signed by: Lucía Vasquez 9/8/2024 2:33 AM Dictation workstation:   [id number]      Medications   Scheduled Medications  [START ON 9/9/2024] cefTRIAXone, 1 g, intravenous, q24h  insulin lispro, 0-10 Units, subcutaneous, TID  methylPREDNISolone sodium succinate (PF), 20 mg, intravenous, q8h  metroNIDAZOLE, 500 mg, intravenous, q8h  pantoprazole, 40 mg, intravenous, BID  potassium chloride, 20 mEq, intravenous, q2h     Continuous Medications    PRN Medications  PRN medications: ondansetron           Assessment/Plan     Dave Alfaro is a 29 y.o. male with PMH Crohn's disease and arthritis presenting with a chief complaint of rectal bleeding. High suspicion for Crohn's flare given similar pain as in prior flares and absence of biologic treatment for 7-8 months. Hemodynamically stable in ED but will continue to watch closely. Hb drop from 14.2 to 11.9, will trend with next morning labs. Pt continued to have bright red bloody BM in ED. Patient placed on steroids and IV PPI. CT noted colonic wall thickening concerning for active  GI consulted to consider possible endoscopic evaluation, may need to bowel prep tonight pending recs. Colorectal surgery following for abscess drainage, will continue CTX and flagyl.    Brief Plan 9/8/24:  - Consult GI for suspected Crohn's flare  - Continue IV methylpred 20mg q8  - Start protonix 40mg BID  - Continue CTX + flagyl  - Evening RFP/Mg    #LGIB  #Crohn's disease  - 4-5 day history of diarrhea, BM became bright red yesterdya which prompted ED visit  - Suspect LGIB likely from Crohn's disease flare  - Pt known to develop rectal bleeding and pain with flares  - Last Humira 7-8 months ago  - Elevated CRP and ESR  - CT inflammatory findings likely from active Crohn's  Plan:  - GI consulted, appreciate recs  - Continue IV methylpred 20mg q8  - Statr IV protonix 40mg BID  - 2x blood cultures  - Stool PCR, c diff, fecal calprotectin  - Hep B/C + T spot for biologic re initiation as outpatient  - Daily CBC  - Active T&S, patient consented    #Perirectal/anal abscesses  - CT found possible fistulous tract formation with perirectal/perianal fluid collections  - ACS performed I&D in ED  - Pt reports pain relief after procedure  - CTX and flagyl started  Plan:  - Colorectal surgery following, appreciate recs  - Continue CTX and Flagyl  - F/u tissue cultures    #Right chest pain  #Right shoulder pain  - Low concern for ACS based on EKG  - Pain is reproducible with movement of right  shoulder  - Possibly arthritic extraintestinal manifestation of Crohn's disease  - No shoulder MRI per chart review  Plan:  - PRN EKG for ACS symptoms  - Consider trops if pain is persistent  - Outpatient follow up with orthopedics    #Hypokalemia  - K 3.2 in ED, given 40 mEq but K still low 3.0 on recheck  - Likely 2/2 to diarrhea and GI losses  - Possible EKG changes, T wave inversions noted on EKG but may be chronic  Plan:  - Ordered additional 40 mEq IV potassium  - Daily BMP  - Continue to replete as needed  - Possible bowel prep tonight, if so need to aggressively replete    F: PRN  E: Replete for K>4, Mg>2, phos>3  N: NPO Diet; Effective midnight  Adult diet Clear Liquid   A: 2 pIVs    Oxygen: none  Abx: cefTRIAXone - 1 gram/50 mL  metroNIDAZOLE - 500 mg/100 mL   Gtts: none    DVT ppx: holding in setting of GI bleed  GI ppx: protonix  Bowel regimen: none    Code Status: Full Code (confirmed on admission)  Surrogate Medical Decision-maker: Elda Perdomo (Parent) [phone]    Albin Alvarado MD  Internal Medicine PGY-1

## 2024-09-08 NOTE — HOSPITAL COURSE
Dave Alfaro is a 29 y.o. male with PMH Crohn's disease and arthritis presenting with a chief complaint of rectal bleeding.     In the ED, patient was treated for suspected Crohn's flare. IV methylprednisolone 20mg q8 started, IV protonix 80mg given. 1L LR bolus given persistent diarrhea and fluid losses. 0.5mg IV Dilaudid for pain control. Patient had an additional bright red bloody BM in the ED. CT A/P noted likely fistulous tract formation along right intergluteal fold. ACS was consulted for perirectal/perianal abscess noted on exam, performed I&D which relieved rectal pain. Patient started on ceftriaxone and metronidazole.     EGD and colonoscopy completed 9/9/2024. EGD showed normal appearance of esophagus, mild patchy erythematous mucosa in the antrum, and normal appearance of duodenum. Biopsy obtained to rule out H. Pylori. Colonoscopy showed mild erythematous mucosa in the terminal ileum, consistent with Crohn's disease. Cobblestone, edematous, erythematous, friable, ulcerated mucosa in the cecum, ascending colon, transverse colon, descending colon and sigmoid colon, consistent with Crohn's disease. Biopsy obtained. Medium (grade 2) hemorrhoids present. Pelvic MRI wo contrast showed asymmetric thickening of the right intergluteal cleft from the posterior rectal canal to the skin with linear T2 hyperintensity/diffusion restriction suggestive of fistulous connection.    At the time of discharge, patient reported symptomatic improvement after 72 hours of IV methylpred 20 mg TID. He notes that he is still having about 5 Bms per day, but these are no longer bloody and his abdominal pain and cramping has resolved. He was able to tolerate a regular diet without symptom exacerbation. Rectal abscess with no pain since I&D, wound cultures only yielded gram positive cocci with rare mixed skin microorganisms.    Patient referred to follow up with Dr. Ibarra to plan for restarting Humira and follow up of biopsy results.  He also has follow up scheduled with colorectal surgery to discuss fistula management. Discharged on prednisone taper 40 mg PO daily, decrease by 5 mg every week. Prescribed 4-day course of Cipro/flagyl to complete 7 days of antibiotics for rectal abscess.

## 2024-09-08 NOTE — ED PROVIDER NOTES
History of Present Illness     History provided by: Patient  Limitations to History: None  External Records Reviewed with Brief Summary: None    HPI:  Dave Alfaro is a 29 y.o. male past medical history of Crohn's with extraintestinal manifestations of arthritis who presents today for abdominal pain. Patient states that he has been having more abdominal pain for the last 4 to 5 days, however, noted that he had 2 bloody bowel movements prior to arrival today. He does also endorse that he feels like he has a significant amount of rectal pain, which is not usual for him. He denies any fevers or chills, denies any diarrhea prior to this. He does endorse diffuse abdominal pain as well as nausea and vomiting with this. He denies any body aches or chills. Denies any known sick contacts.  Denies any numbness weakness tingling in his arms or legs. Denies any loss of bowel or bladder, denies any dysuria.    Physical Exam   Triage vitals:  T 37 °C (98.6 °F)  HR 91  /84  RR 18  O2 96 % None (Room air)    Physical Exam  Vitals and nursing note reviewed.   Constitutional:       General: He is in acute distress.      Appearance: Normal appearance. He is normal weight. He is not ill-appearing or diaphoretic.   HENT:      Head: Normocephalic and atraumatic.      Mouth/Throat:      Mouth: Mucous membranes are moist.      Pharynx: No oropharyngeal exudate or posterior oropharyngeal erythema.   Eyes:      General: No scleral icterus.     Extraocular Movements: Extraocular movements intact.      Pupils: Pupils are equal, round, and reactive to light.   Cardiovascular:      Rate and Rhythm: Normal rate and regular rhythm.      Pulses: Normal pulses.      Heart sounds: Normal heart sounds. No murmur heard.     No gallop.   Pulmonary:      Effort: Pulmonary effort is normal. No respiratory distress.      Breath sounds: Normal breath sounds. No stridor. No wheezing, rhonchi or rales.   Abdominal:      General: Abdomen is flat.  Bowel sounds are normal. There is no distension.      Palpations: Abdomen is soft. There is no mass.      Tenderness: There is abdominal tenderness in the periumbilical area, suprapubic area and left lower quadrant. There is no guarding or rebound. Negative signs include Ramos's sign and McBurney's sign.      Hernia: No hernia is present.   Genitourinary:     Comments: External hemorrhoid present, internal rectal exam deferred by patient, 3 cm in diameter palpable area of fluctuance in the right buttock near gluteal cleft  Musculoskeletal:         General: No swelling, deformity or signs of injury. Normal range of motion.      Cervical back: Normal range of motion and neck supple. No tenderness.   Skin:     General: Skin is warm and dry.      Capillary Refill: Capillary refill takes less than 2 seconds.      Coloration: Skin is not pale.      Findings: No erythema, lesion or rash.   Neurological:      General: No focal deficit present.      Mental Status: He is alert and oriented to person, place, and time. Mental status is at baseline.      Cranial Nerves: No cranial nerve deficit.      Motor: No weakness.   Psychiatric:         Mood and Affect: Mood normal.         Behavior: Behavior normal.       Medical Decision Making & ED Course   Medical Decision Makin y.o. male past medical history of Crohn's with extra intestinal manifestation of arthritis who presents today for abdominal pain and rectal pain. Given the fact the patient has not been able to take his Humira, my concern is that the patient is having a Crohn's flare.  Additionally, there are other concerns of possible Crohn's complications such as fistula or ulceration which may require additional evaluation and treatment.  I will give him fluids Zofran as well as 0.5 of Dilaudid and get basic labs on him.  I will also get a CT of his abdomen and pelvis without contrast.  While would prefer to do it with contrast, he does have an allergy to contrast  media, so feel that the patient would be appropriate for a CT without contrast due to this reason. Patient CT did demonstrate fluid collection in his right gluteal cheek with some extension that extended from his rectum concerning for possible enterocutaneous fistula. Did also demonstrate evidence of inflammatory changes around the colon suggestive of colitis. Given the fact the patient has not been on his Humira, this is most consistent with colitis secondary to Crohn's, especially with CRP bump.  Given the fact that the patient is a Crohn's patient, we will contact ACS/colorectal for evaluation and I&D of his perianal abscess. They were able to perform this, they recommended IV antibiotic treatment with ceftriaxone and metronidazole, which we will provide here in the emergency department. Patient did have a white count as well as an elevated CRP, which is suggestive of an inflammatory process. Given the fact the patient is having bloody bowel movements, we will also admit him to the GI service for further treatment and evaluation of his Crohn's flare as well as modification of his antibiotic treatment as necessary. Patient was accepted by the admissions coordinator to the GI service. I did also message the GI fellow, however, she did not respond.  ----      Differential diagnoses considered include but are not limited to: Crohn's colitis, diverticulitis, enterocutaneous fistula, peptic ulcer     Social Determinants of Health which Significantly Impact Care: None identified     EKG Independent Interpretation:  EKG shows a normal rate and rhythm, normal axis, normal intervals.  ST depressions in leads II 3 and 4 as well as V4 and 5, not clearly seen in most recent EKG, however, was from a number of years ago s    Independent Result Review and Interpretation: Relevant laboratory and radiographic results were reviewed and independently interpreted by myself.  As necessary, they are commented on in the ED  Course.    Chronic conditions affecting the patient's care: As documented above in MDM    The patient was discussed with the following consultants/services:  ACS, GI, admissions [place] excepted the patient    Care Considerations: As documented above in [facility]    ED Course:  Diagnoses as of 09/08/24 0758   Crohn's colitis, without complications (Multi)     Disposition   As a result of the work-up, the patient was discharged home.  he was informed of his diagnosis and instructed to come back with any concerns or worsening of condition.  he and was agreeable to the plan as discussed above.  he was given the opportunity to ask questions.  All of the patient's questions were answered.    Procedures   Procedures    Patient seen and discussed with ED attending physician.    Manuel Hickey MD  Emergency Medicine       Manuel Hickey MD  Resident  09/08/24 0806       Ryan Sheridan MD  09/08/24 0814

## 2024-09-08 NOTE — CONSULTS
[facility]  ACUTE CARE SURGERY - HISTORY AND PHYSICAL / CONSULT    Patient Name: Dave Alfaro  MRN: [id number]  Admit Date: 907  : 1995  AGE: 29 y.o.   GENDER: male  Inpatient consult to Acute Care Surgery  Consult performed by: Juanita Coffey MD  Consult ordered by: Ryan Sheridan MD  Reason for consult: Crohn's flare, perirectal abscess vs fistula      ==============================================================================  TODAY'S ASSESSMENT AND PLAN OF CARE:  Dave Alfaro is a 29 y.o. year old male patient with a PMH of Crohns and arthritis presenting today for LLQ pain, nausea and vomiting and rectal bleeding. Not currently taking biologic medications for his Crohn's disease, CT AP consistent with active flare of ascending through distal descending colon. Also noted to have soft tissue inflammation of R medical buttock. Examination demonstrating area of induration with central area of fluctuance concerning for perianal abscess. Leukocytosis on labs to 11.9. Area drained at bedside, see procedure note for additional details.     Recommendations:  - bedside drainage of perirectal abscess  - f/up culture results  - ceftriaxone and flagyl  - GI consult  - medical admission for management of Crohn's flare  - will defer remainder of management to Colorectal and primary teams    Plans discussed with senior resident Dr. Flako Michael PGY5 and attending physician Dr. Weathers.    Juanita Coffey MD  Acute Care Surgery     ==============================================================================  CHIEF COMPLAINT/REASON FOR CONSULT:  Dave Alfaro is a 29 y.o. year old male patient with a PMH of Crohns and arthritis presenting today for LLQ pain, nausea and vomiting and rectal bleeding. Patient has a hx of Crohn's disease (dx in ) most recently been managed on Humira. His last dose of Humira was 7-8 mos ago, states that he ran out and has missed his past  several GI appts. He follows with Dr. Ibarra for his GI. States that he has been feeling unwell for the past several days, with 2-3 episodes of yellow-green diarrhea daily, chills, nausea, emesis and LLQ pain. States that the pain feels similar to previous Crohn's flares. When having a bowel movement in the evening of 9/7 noted blood in the toilet with his BM and with wiping. Thinks his last colonoscopy was 2-3 years ago, is unsure of exact timing. Has not had a flare of his Crohn's in 2-3 years. Presented to the emergency department for further evaluation.    In the ED, he is afebrile, nontachycardic, normotensive, saturating well on room air. Labs notable for WBC 14.2 and CRP elevated to 7.65. CT scan of the abdomen and pelvis demonstrating colonic wall thickening from the ascending colon to distal descending colon as well as soft tissue thickening on the R medical intergluteal fold concerning for possible abscess vs fistula tract. Surgery consulted for additional recommendations.     PAST MEDICAL HISTORY:   PMH:   Past Medical History:   Diagnosis Date    Crohn's disease, unspecified, without complications (Multi)     Crohn's disease    Hemorrhage of anus and rectum     Hemorrhage of rectum and anus    Melena     Hematochezia    Other conditions influencing health status     Arthritis    Other conditions influencing health status     Arthritis Of Inflammatory Bowel Disease    Pain in unspecified joint     Joint pain    Personal history of diseases of the blood and blood-forming organs and certain disorders involving the immune mechanism     History of iron deficiency anemia    Personal history of other diseases of the musculoskeletal system and connective tissue     History of backache    Personal history of other specified conditions     History of dizziness    Personal history of other specified conditions     History of abdominal pain    Sacroiliitis, not elsewhere classified (CMS-HCC)     Sacroiliitis     Unspecified abdominal hernia without obstruction or gangrene     Hernia       PSH:   Past Surgical History:   Procedure Laterality Date    COLONOSCOPY  12/11/2013    Complete Colonoscopy    HERNIA REPAIR  01/17/2014    Inguinal Hernia Repair    KNEE ARTHROSCOPY W/ DEBRIDEMENT  01/17/2014    Knee Arthroscopy (Therapeutic)    LYMPH NODE BIOPSY  04/08/2014    Biopsy Lymph Node    OTHER SURGICAL HISTORY  01/17/2014    Colonoscopy (Fiberoptic) Forceps Biopsy     FH:   No family history on file.  SOCIAL HISTORY:    Smoking:    Social History     Tobacco Use   Smoking Status Not on file   Smokeless Tobacco Not on file       Alcohol:    Social History     Substance and Sexual Activity   Alcohol Use Not on file       MEDICATIONS:   Prior to Admission medications    Medication Sig Start Date End Date Taking? Authorizing Provider   adalimumab (Humira,CF, Pen) 40 mg/0.4 mL pen injector kit pen-injector Inject under the skin. 8/16/19   Historical Provider, MD     ALLERGIES:   Allergies   Allergen Reactions    Iodinated Contrast Media Hives    Iodine Hives and Swelling    Methotrexate Unknown    Pramoxine-Hc-Chloroxylenol Unknown    Red Dye Hives    Salicylates Other     Pt. Reports causes bleeding issues    Sulfasalazine Rash and Swelling       REVIEW OF SYSTEMS:  A 10 point ROS was conducted, pertinent positives per HPI.   PHYSICAL EXAM:  Physical Exam  Neurological: Awake, alert, conversive  Respiratory/Thorax: even, unlabored  Gastrointestinal: soft, nondistended, mildly tender to palpation int he left upper and left lower quadrants, no rebound or guarding  Rectal: refused  Skin: warm, dry, right medical buttock with area of induration with central area of fluctuance, small amount of purulent drainage noted, tender to palpation  Musculoskeletal: BANEGAS  Eyes: non-icteric  Extremities: no edema   Psychological: appropriate mood/affect    IMAGING SUMMARY:    CT abdomen pelvis wo IV contrast   Final Result   There is colonic wall  thickening extending from the proximal   ascending colon to the distal descending colon with mild pericolonic   inflammatory stranding. Findings concerning for   infectious/inflammatory colitis. No pneumatosis or portal venous gas.   No evidence for free air as clinically question. There is asymmetric   soft tissue thickening along the right medial intergluteal fold with   question some subtle adjacent stranding. Evaluation is limited on   this unenhanced CT. Given patient's history of Crohn's disease   findings concerning for a fistulous tract with developing phlegmonous   changes. Correlate with symptomatology and dedicated exam.        Additional findings as described above.        MACRO:   None        Signed by: Lucía Vasquez 9/8/2024 2:33 AM   Dictation workstation:   [id number]            LABS:  Results from last 7 days   Lab Units 09/07/24  2321   WBC AUTO x10*3/uL 11.9*   HEMOGLOBIN g/dL 14.2   HEMATOCRIT % 40.2*   PLATELETS AUTO x10*3/uL 348   NEUTROS PCT AUTO % 67.1   LYMPHS PCT AUTO % 19.2   MONOS PCT AUTO % 11.4   EOS PCT AUTO % 1.5         Results from last 7 days   Lab Units 09/07/24  2321   SODIUM mmol/L 136   POTASSIUM mmol/L 3.2*   CHLORIDE mmol/L 98   CO2 mmol/L 26   BUN mg/dL 5*   CREATININE mg/dL 0.84   CALCIUM mg/dL 9.8   PROTEIN TOTAL g/dL 9.3*   BILIRUBIN TOTAL mg/dL 0.5   ALK PHOS U/L 61   ALT U/L 5*   AST U/L 12   GLUCOSE mg/dL 92     Results from last 7 days   Lab Units 09/07/24  2321   BILIRUBIN TOTAL mg/dL 0.5             I have reviewed all laboratory and imaging results ordered/pertinent for this encounter.    All other review of systems negative, except as noted in HPI

## 2024-09-08 NOTE — CONSULTS
Reason For Consult  Crohn's, ? Perirectal abscess vs fistula    History Of Present Illness  Dave Alfaro is a 29 y.o. year old male patient with a PMH of Crohns and arthritis presenting today for LLQ pain, nausea and vomiting and rectal bleeding. Patient has a hx of Crohn's disease (dx in 2011) most recently been managed on Humira. His last dose of Humira was 7-8 mos ago, states that he ran out and has missed his past several GI appts. He follows with Dr. Ibarra for his GI. States that he has been feeling unwell for the past several days, with 2-3 episodes of yellow-green diarrhea daily, chills, nausea, emesis and LLQ pain. States that the pain feels similar to previous Crohn's flares. When having a bowel movement in the evening of 9/7 noted blood in the toilet with his BM and with wiping. Thinks his last colonoscopy was 2-3 years ago, is unsure of exact timing. Has not had a flare of his Crohn's in 2-3 years. Presented to the emergency department for further evaluation.     In the ED, he is afebrile, nontachycardic, normotensive, saturating well on room air. Labs notable for WBC 14.2 and CRP elevated to 7.65. CT scan of the abdomen and pelvis demonstrating colonic wall thickening from the ascending colon to distal descending colon as well as soft tissue thickening on the R medical intergluteal fold concerning for possible abscess vs fistula tract. Surgery consulted for additional recommendations.      Past Medical History  He has a past medical history of Crohn's disease, unspecified, without complications (Multi), Hemorrhage of anus and rectum, Melena, Other conditions influencing health status, Other conditions influencing health status, Pain in unspecified joint, Personal history of diseases of the blood and blood-forming organs and certain disorders involving the immune mechanism, Personal history of other diseases of the musculoskeletal system and connective tissue, Personal history of other specified  "conditions, Personal history of other specified conditions, Sacroiliitis, not elsewhere classified (CMS-HCC), and Unspecified abdominal hernia without obstruction or gangrene.    Surgical History  He has a past surgical history that includes Colonoscopy (12/11/2013); Knee arthroscopy w/ debridement (01/17/2014); Lymph node biopsy (04/08/2014); Hernia repair (01/17/2014); and Other surgical history (01/17/2014).     Social History  He has no history on file for tobacco use, alcohol use, and drug use.    Family History  No family history on file.     Allergies  Iodinated contrast media, Iodine, Methotrexate, Pramoxine-hc-chloroxylenol, Red dye, Salicylates, and Sulfasalazine    Review of Systems  A 10 point ROS was conducted,  pertinent positives per HPI     Physical Exam  Physical Exam  Neurological: Awake, alert, conversive  Respiratory/Thorax: even, unlabored  Gastrointestinal: soft, nondistended, mildly tender to palpation int he left upper and left lower quadrants, no rebound or guarding  Rectal: refused  Skin: warm, dry, right medical buttock with area of induration with central area of fluctuance, small amount of purulent drainage noted, tender to palpation  Musculoskeletal: BANEGAS  Eyes: non-icteric  Extremities: no edema   Psychological: appropriate mood/affect     Last Recorded Vitals  Blood pressure (!) 127/92, pulse 80, temperature 37 °C (98.6 °F), temperature source Temporal, resp. rate (!) 0, height 1.727 m (5' 8\"), weight 63.5 kg (140 lb), SpO2 97%.    Relevant Results     14.2    11.9>-----<348         40.2   136  98  5                  ----------------<92     3.2  26  0.84          Ca 9.8 Phos No results found for requested labs within last 365 days. Mg 1.85       ALT 5 AST 12 AlkPhos 61 tBili 0.5             CT abdomen pelvis wo IV contrast   Final Result   There is colonic wall thickening extending from the proximal   ascending colon to the distal descending colon with mild pericolonic "   inflammatory stranding. Findings concerning for   infectious/inflammatory colitis. No pneumatosis or portal venous gas.   No evidence for free air as clinically question. There is asymmetric   soft tissue thickening along the right medial intergluteal fold with   question some subtle adjacent stranding. Evaluation is limited on   this unenhanced CT. Given patient's history of Crohn's disease   findings concerning for a fistulous tract with developing phlegmonous   changes. Correlate with symptomatology and dedicated exam.        Additional findings as described above.        MACRO:   None        Signed by: Lucía Vasquez 9/8/2024 2:33 AM   Dictation workstation:   [id number]             Assessment/Plan     Dave Alfaro is a 29 y.o. year old male patient with a PMH of Crohns and arthritis presenting today for LLQ pain, nausea and vomiting and rectal bleeding. Not currently taking biologic medications for his Crohn's disease, CT AP consistent with active flare of ascending through distal descending colon. Also noted to have soft tissue inflammation of R medical buttock. Examination demonstrating area of induration with central area of fluctuance concerning for perianal abscess. Leukocytosis on labs to 11.9. Area drained at bedside, see procedure note for additional details.      Recommendations:  - bedside drainage of perirectal abscess  - f/up culture results  - ceftriaxone and flagyl  - GI consult  - medical admission for management of Crohn's flare  - Colorectal surgery team will continue to follow     Plans discussed with senior resident Dr. Flako Michael PGY5 and attending physician Dr. Eckert.    Juanita Coffey MD   Colorectal Surgery

## 2024-09-09 ENCOUNTER — APPOINTMENT (OUTPATIENT)
Dept: GASTROENTEROLOGY | Facility: HOSPITAL | Age: 29
End: 2024-09-09
Payer: COMMERCIAL

## 2024-09-09 PROBLEM — K50.914: Status: ACTIVE | Noted: 2024-09-08

## 2024-09-09 LAB
ALBUMIN SERPL BCP-MCNC: 3.3 G/DL (ref 3.4–5)
ALBUMIN SERPL BCP-MCNC: 3.6 G/DL (ref 3.4–5)
ANION GAP SERPL CALC-SCNC: 13 MMOL/L (ref 10–20)
ANION GAP SERPL CALC-SCNC: 15 MMOL/L (ref 10–20)
APTT PPP: 30 SECONDS (ref 27–38)
BASOPHILS # BLD AUTO: 0.01 X10*3/UL (ref 0–0.1)
BASOPHILS NFR BLD AUTO: 0.1 %
BUN SERPL-MCNC: 10 MG/DL (ref 6–23)
BUN SERPL-MCNC: 8 MG/DL (ref 6–23)
C COLI+JEJ+UPSA DNA STL QL NAA+PROBE: NOT DETECTED
CALCIUM SERPL-MCNC: 8.9 MG/DL (ref 8.6–10.6)
CALCIUM SERPL-MCNC: 9 MG/DL (ref 8.6–10.6)
CHLORIDE SERPL-SCNC: 101 MMOL/L (ref 98–107)
CHLORIDE SERPL-SCNC: 101 MMOL/L (ref 98–107)
CO2 SERPL-SCNC: 26 MMOL/L (ref 21–32)
CO2 SERPL-SCNC: 27 MMOL/L (ref 21–32)
CREAT SERPL-MCNC: 0.68 MG/DL (ref 0.5–1.3)
CREAT SERPL-MCNC: 0.77 MG/DL (ref 0.5–1.3)
EC STX1 GENE STL QL NAA+PROBE: NOT DETECTED
EC STX2 GENE STL QL NAA+PROBE: NOT DETECTED
EGFRCR SERPLBLD CKD-EPI 2021: >90 ML/MIN/1.73M*2
EGFRCR SERPLBLD CKD-EPI 2021: >90 ML/MIN/1.73M*2
EOSINOPHIL # BLD AUTO: 0 X10*3/UL (ref 0–0.7)
EOSINOPHIL NFR BLD AUTO: 0 %
ERYTHROCYTE [DISTWIDTH] IN BLOOD BY AUTOMATED COUNT: 11.9 % (ref 11.5–14.5)
GLUCOSE BLD MANUAL STRIP-MCNC: 116 MG/DL (ref 74–99)
GLUCOSE BLD MANUAL STRIP-MCNC: 124 MG/DL (ref 74–99)
GLUCOSE BLD MANUAL STRIP-MCNC: 130 MG/DL (ref 74–99)
GLUCOSE SERPL-MCNC: 106 MG/DL (ref 74–99)
GLUCOSE SERPL-MCNC: 120 MG/DL (ref 74–99)
HCT VFR BLD AUTO: 34.2 % (ref 41–52)
HGB BLD-MCNC: 11.4 G/DL (ref 13.5–17.5)
IMM GRANULOCYTES # BLD AUTO: 0.07 X10*3/UL (ref 0–0.7)
IMM GRANULOCYTES NFR BLD AUTO: 0.5 % (ref 0–0.9)
INR PPP: 1.1 (ref 0.9–1.1)
LYMPHOCYTES # BLD AUTO: 1.18 X10*3/UL (ref 1.2–4.8)
LYMPHOCYTES NFR BLD AUTO: 8.8 %
MAGNESIUM SERPL-MCNC: 2.15 MG/DL (ref 1.6–2.4)
MCH RBC QN AUTO: 30 PG (ref 26–34)
MCHC RBC AUTO-ENTMCNC: 33.3 G/DL (ref 32–36)
MCV RBC AUTO: 90 FL (ref 80–100)
MONOCYTES # BLD AUTO: 0.45 X10*3/UL (ref 0.1–1)
MONOCYTES NFR BLD AUTO: 3.3 %
NEUTROPHILS # BLD AUTO: 11.76 X10*3/UL (ref 1.2–7.7)
NEUTROPHILS NFR BLD AUTO: 87.3 %
NOROVIRUS GI + GII RNA STL NAA+PROBE: NOT DETECTED
NRBC BLD-RTO: 0 /100 WBCS (ref 0–0)
PHOSPHATE SERPL-MCNC: 3.1 MG/DL (ref 2.5–4.9)
PHOSPHATE SERPL-MCNC: 3.7 MG/DL (ref 2.5–4.9)
PLATELET # BLD AUTO: 349 X10*3/UL (ref 150–450)
POTASSIUM SERPL-SCNC: 3.6 MMOL/L (ref 3.5–5.3)
POTASSIUM SERPL-SCNC: 3.8 MMOL/L (ref 3.5–5.3)
PROTHROMBIN TIME: 12.9 SECONDS (ref 9.8–12.8)
RBC # BLD AUTO: 3.8 X10*6/UL (ref 4.5–5.9)
RV RNA STL NAA+PROBE: NOT DETECTED
SALMONELLA DNA STL QL NAA+PROBE: NOT DETECTED
SHIGELLA DNA SPEC QL NAA+PROBE: NOT DETECTED
SODIUM SERPL-SCNC: 137 MMOL/L (ref 136–145)
SODIUM SERPL-SCNC: 138 MMOL/L (ref 136–145)
V CHOLERAE DNA STL QL NAA+PROBE: NOT DETECTED
WBC # BLD AUTO: 13.5 X10*3/UL (ref 4.4–11.3)
Y ENTEROCOL DNA STL QL NAA+PROBE: NOT DETECTED

## 2024-09-09 PROCEDURE — 7100000010 HC PHASE TWO TIME - EACH INCREMENTAL 1 MINUTE

## 2024-09-09 PROCEDURE — 80069 RENAL FUNCTION PANEL: CPT

## 2024-09-09 PROCEDURE — 3700000012 HC SEDATION LEVEL 5+ TIME - INITIAL 15 MINUTES 5/> YEARS

## 2024-09-09 PROCEDURE — 83735 ASSAY OF MAGNESIUM: CPT

## 2024-09-09 PROCEDURE — 99221 1ST HOSP IP/OBS SF/LOW 40: CPT

## 2024-09-09 PROCEDURE — 36415 COLL VENOUS BLD VENIPUNCTURE: CPT

## 2024-09-09 PROCEDURE — 0DBM8ZX EXCISION OF DESCENDING COLON, VIA NATURAL OR ARTIFICIAL OPENING ENDOSCOPIC, DIAGNOSTIC: ICD-10-PCS | Performed by: INTERNAL MEDICINE

## 2024-09-09 PROCEDURE — 0DB68ZX EXCISION OF STOMACH, VIA NATURAL OR ARTIFICIAL OPENING ENDOSCOPIC, DIAGNOSTIC: ICD-10-PCS | Performed by: INTERNAL MEDICINE

## 2024-09-09 PROCEDURE — 0DBL8ZX EXCISION OF TRANSVERSE COLON, VIA NATURAL OR ARTIFICIAL OPENING ENDOSCOPIC, DIAGNOSTIC: ICD-10-PCS | Performed by: INTERNAL MEDICINE

## 2024-09-09 PROCEDURE — 85610 PROTHROMBIN TIME: CPT

## 2024-09-09 PROCEDURE — 1100000001 HC PRIVATE ROOM DAILY

## 2024-09-09 PROCEDURE — 85025 COMPLETE CBC W/AUTO DIFF WBC: CPT

## 2024-09-09 PROCEDURE — 7100000009 HC PHASE TWO TIME - INITIAL BASE CHARGE

## 2024-09-09 PROCEDURE — 2500000002 HC RX 250 W HCPCS SELF ADMINISTERED DRUGS (ALT 637 FOR MEDICARE OP, ALT 636 FOR OP/ED)

## 2024-09-09 PROCEDURE — 2500000004 HC RX 250 GENERAL PHARMACY W/ HCPCS (ALT 636 FOR OP/ED)

## 2024-09-09 PROCEDURE — 99152 MOD SED SAME PHYS/QHP 5/>YRS: CPT | Performed by: INTERNAL MEDICINE

## 2024-09-09 PROCEDURE — 0DBP8ZX EXCISION OF RECTUM, VIA NATURAL OR ARTIFICIAL OPENING ENDOSCOPIC, DIAGNOSTIC: ICD-10-PCS | Performed by: INTERNAL MEDICINE

## 2024-09-09 PROCEDURE — 82947 ASSAY GLUCOSE BLOOD QUANT: CPT

## 2024-09-09 PROCEDURE — 2500000004 HC RX 250 GENERAL PHARMACY W/ HCPCS (ALT 636 FOR OP/ED): Performed by: INTERNAL MEDICINE

## 2024-09-09 PROCEDURE — 0753T DGTZ GLS MCRSCP SLD LEVEL IV: CPT | Mod: TC,SUR | Performed by: INTERNAL MEDICINE

## 2024-09-09 PROCEDURE — 3700000013 HC SEDATION LEVEL 5+ TIME - EACH ADDITIONAL 15 MINUTES

## 2024-09-09 PROCEDURE — 45380 COLONOSCOPY AND BIOPSY: CPT | Performed by: INTERNAL MEDICINE

## 2024-09-09 PROCEDURE — 99153 MOD SED SAME PHYS/QHP EA: CPT | Performed by: INTERNAL MEDICINE

## 2024-09-09 PROCEDURE — 0DBK8ZX EXCISION OF ASCENDING COLON, VIA NATURAL OR ARTIFICIAL OPENING ENDOSCOPIC, DIAGNOSTIC: ICD-10-PCS | Performed by: INTERNAL MEDICINE

## 2024-09-09 PROCEDURE — 43239 EGD BIOPSY SINGLE/MULTIPLE: CPT | Performed by: INTERNAL MEDICINE

## 2024-09-09 PROCEDURE — 99233 SBSQ HOSP IP/OBS HIGH 50: CPT

## 2024-09-09 RX ORDER — MIDAZOLAM HYDROCHLORIDE 1 MG/ML
INJECTION, SOLUTION INTRAMUSCULAR; INTRAVENOUS AS NEEDED
Status: COMPLETED | OUTPATIENT
Start: 2024-09-09 | End: 2024-09-09

## 2024-09-09 RX ORDER — POTASSIUM CHLORIDE 14.9 MG/ML
20 INJECTION INTRAVENOUS
Status: DISPENSED | OUTPATIENT
Start: 2024-09-09 | End: 2024-09-09

## 2024-09-09 RX ORDER — ONDANSETRON HYDROCHLORIDE 2 MG/ML
INJECTION, SOLUTION INTRAVENOUS AS NEEDED
Status: COMPLETED | OUTPATIENT
Start: 2024-09-09 | End: 2024-09-09

## 2024-09-09 RX ORDER — POTASSIUM CHLORIDE 20 MEQ/1
20 TABLET, EXTENDED RELEASE ORAL ONCE
Status: COMPLETED | OUTPATIENT
Start: 2024-09-09 | End: 2024-09-09

## 2024-09-09 RX ORDER — FENTANYL CITRATE 50 UG/ML
INJECTION, SOLUTION INTRAMUSCULAR; INTRAVENOUS AS NEEDED
Status: COMPLETED | OUTPATIENT
Start: 2024-09-09 | End: 2024-09-09

## 2024-09-09 RX ADMIN — METHYLPREDNISOLONE SODIUM SUCCINATE 20 MG: 125 INJECTION INTRAMUSCULAR; INTRAVENOUS at 17:20

## 2024-09-09 RX ADMIN — FENTANYL CITRATE 25 MCG: 50 INJECTION, SOLUTION INTRAMUSCULAR; INTRAVENOUS at 14:34

## 2024-09-09 RX ADMIN — MIDAZOLAM HYDROCHLORIDE 1 MG: 1 INJECTION, SOLUTION INTRAMUSCULAR; INTRAVENOUS at 14:00

## 2024-09-09 RX ADMIN — POTASSIUM CHLORIDE 20 MEQ: 14.9 INJECTION, SOLUTION INTRAVENOUS at 01:46

## 2024-09-09 RX ADMIN — FENTANYL CITRATE 50 MCG: 50 INJECTION, SOLUTION INTRAMUSCULAR; INTRAVENOUS at 13:58

## 2024-09-09 RX ADMIN — FENTANYL CITRATE 25 MCG: 50 INJECTION, SOLUTION INTRAMUSCULAR; INTRAVENOUS at 14:00

## 2024-09-09 RX ADMIN — MIDAZOLAM HYDROCHLORIDE 2 MG: 1 INJECTION, SOLUTION INTRAMUSCULAR; INTRAVENOUS at 14:19

## 2024-09-09 RX ADMIN — MIDAZOLAM HYDROCHLORIDE 1 MG: 1 INJECTION, SOLUTION INTRAMUSCULAR; INTRAVENOUS at 14:34

## 2024-09-09 RX ADMIN — MIDAZOLAM HYDROCHLORIDE 1 MG: 1 INJECTION, SOLUTION INTRAMUSCULAR; INTRAVENOUS at 14:04

## 2024-09-09 RX ADMIN — PANTOPRAZOLE SODIUM 40 MG: 40 INJECTION, POWDER, FOR SOLUTION INTRAVENOUS at 20:56

## 2024-09-09 RX ADMIN — POTASSIUM CHLORIDE 20 MEQ: 1500 TABLET, EXTENDED RELEASE ORAL at 17:20

## 2024-09-09 RX ADMIN — MIDAZOLAM HYDROCHLORIDE 1 MG: 1 INJECTION, SOLUTION INTRAMUSCULAR; INTRAVENOUS at 14:26

## 2024-09-09 RX ADMIN — METHYLPREDNISOLONE SODIUM SUCCINATE 20 MG: 125 INJECTION INTRAMUSCULAR; INTRAVENOUS at 22:40

## 2024-09-09 RX ADMIN — METRONIDAZOLE 500 MG: 5 INJECTION, SOLUTION INTRAVENOUS at 20:56

## 2024-09-09 RX ADMIN — FENTANYL CITRATE 25 MCG: 50 INJECTION, SOLUTION INTRAMUSCULAR; INTRAVENOUS at 14:26

## 2024-09-09 RX ADMIN — MIDAZOLAM HYDROCHLORIDE 1 MG: 1 INJECTION, SOLUTION INTRAMUSCULAR; INTRAVENOUS at 14:08

## 2024-09-09 RX ADMIN — PANTOPRAZOLE SODIUM 40 MG: 40 INJECTION, POWDER, FOR SOLUTION INTRAVENOUS at 08:27

## 2024-09-09 RX ADMIN — ONDANSETRON 4 MG: 2 INJECTION, SOLUTION INTRAMUSCULAR; INTRAVENOUS at 13:55

## 2024-09-09 RX ADMIN — FENTANYL CITRATE 50 MCG: 50 INJECTION, SOLUTION INTRAMUSCULAR; INTRAVENOUS at 14:19

## 2024-09-09 RX ADMIN — CEFTRIAXONE SODIUM 1 G: 1 INJECTION, SOLUTION INTRAVENOUS at 06:07

## 2024-09-09 RX ADMIN — FENTANYL CITRATE 25 MCG: 50 INJECTION, SOLUTION INTRAMUSCULAR; INTRAVENOUS at 14:03

## 2024-09-09 RX ADMIN — METHYLPREDNISOLONE SODIUM SUCCINATE 20 MG: 125 INJECTION INTRAMUSCULAR; INTRAVENOUS at 06:07

## 2024-09-09 RX ADMIN — MIDAZOLAM HYDROCHLORIDE 2 MG: 1 INJECTION, SOLUTION INTRAMUSCULAR; INTRAVENOUS at 13:54

## 2024-09-09 RX ADMIN — MIDAZOLAM HYDROCHLORIDE 2 MG: 1 INJECTION, SOLUTION INTRAMUSCULAR; INTRAVENOUS at 13:58

## 2024-09-09 RX ADMIN — METRONIDAZOLE 500 MG: 5 INJECTION, SOLUTION INTRAVENOUS at 12:15

## 2024-09-09 RX ADMIN — FENTANYL CITRATE 25 MCG: 50 INJECTION, SOLUTION INTRAMUSCULAR; INTRAVENOUS at 14:07

## 2024-09-09 RX ADMIN — FENTANYL CITRATE 50 MCG: 50 INJECTION, SOLUTION INTRAMUSCULAR; INTRAVENOUS at 13:54

## 2024-09-09 RX ADMIN — METRONIDAZOLE 500 MG: 5 INJECTION, SOLUTION INTRAVENOUS at 04:39

## 2024-09-09 ASSESSMENT — PAIN SCALES - GENERAL

## 2024-09-09 ASSESSMENT — PAIN - FUNCTIONAL ASSESSMENT
PAIN_FUNCTIONAL_ASSESSMENT: 0-10

## 2024-09-09 NOTE — PROGRESS NOTES
Dave Alfaro is a 29 y.o. male on day 1 of admission presenting with Crohn's colitis, without complications (Multi)    Subjective     This morning, patient reports that his rectal pain remains improved since his I&D yesterday. He has completed his bowel prep in anticipation of EGD and colonoscopy today.       Objective     Vitals:  Visit Vitals  /70   Pulse 57   Temp 37 °C (98.6 °F)   Resp 18       No intake/output data recorded.  Net IO Since Admission: 50 mL [09/09/24 1319]    Physical exam:  Constitutional: Patient does not appear to be in any acute distress, AOx4  HEENT: NCAT, normal external inspection of ears and nose. Oropharynx normal.  Cardio: RRR, S1/S2, no murmurs, rubs, or gallops, radial pulses +2, no edema of extremities  Pulm: CTAB, no respiratory distress.  GI: +BS, soft, non-tender, nondistended, no guarding or rebound, no masses noted  MSK: No joint swelling, normal movements of all extremities. Normal ROM, 5/5 strength  Skin: No lesions, contusions, or erythema.  Extremities: no BLE swelling   Neuro: No focal deficits  Psych: Appropriate mood and behavior    Medications:  Scheduled medications  cefTRIAXone, 1 g, intravenous, q24h  methylPREDNISolone sodium succinate (PF), 20 mg, intravenous, q8h  metroNIDAZOLE, 500 mg, intravenous, q8h  pantoprazole, 40 mg, intravenous, BID  potassium chloride CR, 20 mEq, oral, Once      Continuous medications     PRN medications  PRN medications: ondansetron, polyethylene glycol-electrolytes    Labs:  Results from last 7 days   Lab Units 09/09/24  0725 09/08/24  0702 09/07/24  2321   HEMOGLOBIN g/dL 11.4* 11.9* 14.2   HEMATOCRIT % 34.2* 33.5* 40.2*   WBC AUTO x10*3/uL 13.5* 13.2* 11.9*   PLATELETS AUTO x10*3/uL 349 309 348     Results from last 7 days   Lab Units 09/09/24  0725 09/08/24  1828 09/08/24  0523   SODIUM mmol/L 138 134* 138   POTASSIUM mmol/L 3.6 3.0* 3.0*   CHLORIDE mmol/L 101 100 101   CO2 mmol/L 26 23 22   ANION GAP mmol/L 15 14 18   BUN  mg/dL 8 7 6   CREATININE mg/dL 0.77 0.78 0.71   GLUCOSE mg/dL 120* 134* 110*   CALCIUM mg/dL 9.0 8.9 9.6   MAGNESIUM mg/dL 2.15 2.05 1.78   PHOSPHORUS mg/dL 3.1 3.4 3.5      Results from last 7 days   Lab Units 09/07/24  2321   ALT U/L 5*   AST U/L 12   ALK PHOS U/L 61   BILIRUBIN TOTAL mg/dL 0.5      Results from last 7 days   Lab Units 09/09/24  0725   INR  1.1       Results from last 7 days   Lab Units 09/07/24  2321   LIPASE U/L 12       Imaging:  CT abdomen pelvis wo IV contrast 9/8/2024  There is colonic wall thickening extending from the proximal ascending colon to the distal descending colon with mild pericolonic inflammatory stranding. Findings concerning for infectious/inflammatory colitis. No pneumatosis or portal venous gas. No evidence for free air as clinically question. There is asymmetric soft tissue thickening along the right medial intergluteal fold with question some subtle adjacent stranding. Evaluation is limited on this unenhanced CT. Given patient's history of Crohn's disease findings concerning for a fistulous tract with developing phlegmonous changes.     Assessment/Plan:  Dave Alfaro is a 29 y.o. male with PMH Crohn's disease and arthritis presenting with a chief complaint of rectal bleeding. High suspicion for Crohn's flare given similar pain as in prior flares and absence of biologic treatment for 7-8 months. Hemodynamically stable in ED but will continue to watch closely. Hb drop from 14.2 to 11.9, will trend with next morning labs. Pt continued to have bright red bloody BM in ED. Patient placed on steroids and IV PPI. CT noted colonic wall thickening concerning for active flare. GI consulted to consider possible endoscopic evaluation, may need to bowel prep tonight pending recs. Colorectal surgery following for abscess drainage, will continue CTX and flagyl.     Brief Plan 9/9/24:  - Continue IV methylpred 20mg q8  - Continue protonix 40mg BID  - Continue CTX + flagyl  -  EGD/colonoscopy today  - Follow up wound culture to guide antibiotic selection    #LGIB  #Crohn's disease  - 4-5 day history of diarrhea, patient presented to ED 9/8 due to onset of bright red stool  - Suspect LGIB likely from Crohn's disease flare  - Pt known to develop rectal bleeding and pain with flares  - Last Humira 7-8 months ago  - Elevated CRP and ESR  - CT inflammatory findings likely from active Crohn's  - Stool pathogen panel and C. Diff from 9/8/24 both negative  - Bcx obtained 9/8: NGTD x1 day  Plan:  - GI consulted, appreciate recs  - Continue IV methylpred 20mg q8  - IV protonix 40mg BID  - 2x blood cultures  - Hep B/C + T spot for biologic re initiation as outpatient  - Daily CBC  - Active T&S, consent for blood on file 9/8     #Perirectal/anal abscesses  - CT found possible fistulous tract formation with perirectal/perianal fluid collections  - ACS performed I&D in ED  - Pt reports pain relief after procedure  - CTX and flagyl started  - Wound cx 9/8/24: abundant polymorphonuclear leukocytes, few gram positive cocci  Plan:  - Colorectal surgery following, appreciate recs  - Continue CTX and Flagyl  - Rectal MRI  - Outpatient follow up with Dr. Eckert to follow up on MRI and discuss fistula management     #Right chest pain  #Right shoulder pain  - Low concern for ACS based on EKG  - Pain is reproducible with movement of right shoulder  - Possibly arthritic extraintestinal manifestation of Crohn's disease  - No shoulder MRI per chart review  Plan:  - PRN EKG for ACS symptoms  - Consider trops if pain is persistent  - Outpatient follow up with orthopedics     #Hypokalemia  - K 3.2 in ED, given 40 mEq and now improving to 3.6  - Likely 2/2 to diarrhea and GI losses  - Possible EKG changes, T wave inversions noted on EKG but may be chronic  Plan:  - Ordered additional 20 mEq potassium  - Daily BMP  - Continue to replete as needed     F: PRN  E: Replete for K>4, Mg>2, phos>3  N: NPO Diet; Effective  midnight  Adult diet Clear Liquid   A: 2 pIVs     Oxygen: none  Abx: cefTRIAXone - 1 gram/50 mL  metroNIDAZOLE - 500 mg/100 mL   Gtts: none     DVT ppx: holding in setting of GI bleed  GI ppx: protonix  Bowel regimen: none    Code Status: Full Code (confirmed on admission)  Surrogate Medical Decision-maker: Elda Perdomo (Parent) [phone]    Plan is not finalized till staffed with the attending physician Dr. Hopkins.    Linda Mata MD  PGY-1 Internal Medicine  Please Haiku with any questions or concerns.

## 2024-09-09 NOTE — CONSULTS
Colorectal Surgery Consult Progress Note     Reason for consult: history of perianal Crohns with c/f new fistula             HPI: Dave Alfaro is a 29 y.o. male with a past medical history of iron deficiency anemia and Crohns (diagnosed 2011, noncompliant on prescribed Humira) c/b sacroiliitis and perianal abscess (last I&D 2019), now hospital day 1 after presenting with abdominal cramping L>R, severe rectal pain x1 week, nausea, vomiting, diarrhea, and hematochezia.    Subjective:  No acute events overnight  Abdominal cramping L>R moderately controlled and improving; rectal pain only with bowel movements  Denies nausea/vomiting/hematemesis  Passing gas and bowel movements, denies hematochezia  Voiding without difficulty    Past Medical History:   Diagnosis Date    Crohn's disease, unspecified, without complications (Multi)     Crohn's disease    Hemorrhage of anus and rectum     Hemorrhage of rectum and anus    Melena     Hematochezia    Other conditions influencing health status     Arthritis    Other conditions influencing health status     Arthritis Of Inflammatory Bowel Disease    Pain in unspecified joint     Joint pain    Personal history of diseases of the blood and blood-forming organs and certain disorders involving the immune mechanism     History of iron deficiency anemia    Personal history of other diseases of the musculoskeletal system and connective tissue     History of backache    Personal history of other specified conditions     History of dizziness    Personal history of other specified conditions     History of abdominal pain    Sacroiliitis, not elsewhere classified (CMS-HCC)     Sacroiliitis    Unspecified abdominal hernia without obstruction or gangrene     Hernia      Past Surgical History:   Procedure Laterality Date    COLONOSCOPY  12/11/2013    Complete Colonoscopy    HERNIA REPAIR  01/17/2014    Inguinal Hernia Repair    KNEE ARTHROSCOPY W/ DEBRIDEMENT  01/17/2014     "Knee Arthroscopy (Therapeutic)    LYMPH NODE BIOPSY  04/08/2014    Biopsy Lymph Node    OTHER SURGICAL HISTORY  01/17/2014    Colonoscopy (Fiberoptic) Forceps Biopsy      Past social history: no history on file for tobacco use, alcohol use, and drug use.    Allergies   Allergen Reactions    Iodinated Contrast Media Hives    Iodine Hives and Swelling    Methotrexate Unknown    Pramoxine-Hc-Chloroxylenol Unknown    Red Dye Hives    Salicylates Other     Pt. Reports causes bleeding issues    Sulfasalazine Rash and Swelling        REVIEW OF SYSTEMS:  A 10 point ROS was conducted, pertinent positives per HPI.       Objective  Physical Exam:  Gen: thin, pleasant, in no physical distress and alert  HENT: Head normocephalic, atraumatic.  Mucous membranes moist.    Neuro: Alert and oriented x3.    CV: Normal rate and rhythm  Resp: No acute respiratory distress.  Normal effort on room air. Chest expansion symmetrical.   GI: LLQ Abdomen is mildly tender to palpation, soft and nondistended, without guarding or rebound.  Skin: Warm and dry, without rashes or lesions. Anus and buttocks without drainage or fluctuance, open to air.  Musculoskeletal: Moves all extremities x4    No intake/output data recorded.  No intake/output data recorded.    Data Review:  CBC:   Lab Results   Component Value Date    WBC 13.2 (H) 09/08/2024    RBC 3.99 (L) 09/08/2024     BMP:   Lab Results   Component Value Date    GLUCOSE 134 (H) 09/08/2024    CO2 23 09/08/2024    BUN 7 09/08/2024    CREATININE 0.78 09/08/2024    CALCIUM 8.9 09/08/2024     Coagulation: No results found for: \"INR\", \"APTT\"    Imaging:  CT A/P 9/8/24 showing colonic wall thickening from proximal ascending colon to distal descending colon with mild pericolonic inflammatory stranding, concerning for infection/inflammatory colitis.  No evidence of free air.    Assessment: 29 y.o. male presenting with symptoms most consistent with Crohns flare and rectal pain with CT showing right " gluteal fluid collection, s/p bedside I&D 9/8/24 with general surgery.  Stool studies negative, Crohns symptoms improving with IV steroids, rectal pain improved post-I&D.  Patient is on Rocephin/Flagyl per primary team. Has scheduled EGD/colonoscopy today and plan for rectal MRI to evaluate extent of disease/possible fistula in setting of perianal Crohns. At this time, patient does not require immediate surgical intervention by colorectal surgery     Recommendations:  -Agree with continuing IV steroids/getting patient back on Crohns treatment regimen per GI  -Agree with daily CBC/electrolyte repletion for goals of K>4, Mg >2  -Rest of care per primary & GI teams  -Will follow up scopes and rectal MRI, and will schedule outpatient follow up with Dr. Eckert to discuss fistula management    Plan of care discussed with patient and colorectal surgery team.    Savita SHARMA-CNP  Colorectal Surgery NP   [facility] Pager #[phone]

## 2024-09-09 NOTE — CARE PLAN
The patient's goals for the shift include      The clinical goals for the shift include Admit and orient patient to unit    Problem: Nutrition  Goal: Less than 5 days NPO/clear liquids  Outcome: Progressing  Goal: Oral intake greater than 50%  Outcome: Progressing  Goal: Oral intake greater 75%  Outcome: Progressing  Goal: Consume prescribed supplement  Outcome: Progressing  Goal: Adequate PO fluid intake  Outcome: Progressing  Goal: Nutrition support goals are met within 48 hrs  Outcome: Progressing  Goal: Nutrition support is meeting 75% of nutrient needs  Outcome: Progressing  Goal: Tube feed tolerance  Outcome: Progressing  Goal: BG  mg/dL  Outcome: Progressing  Goal: Lab values WNL  Outcome: Progressing  Goal: Electrolytes WNL  Outcome: Progressing  Goal: Promote healing  Outcome: Progressing  Goal: Maintain stable weight  Outcome: Progressing  Goal: Reduce weight from edema/fluid  Outcome: Progressing  Goal: Gradual weight gain  Outcome: Progressing  Goal: Improve ostomy output  Outcome: Progressing

## 2024-09-09 NOTE — SIGNIFICANT EVENT
Brief Gastroenterology Update    The patient underwent diagnostic EGD and colonoscopy this afternoon to assess disease activity for Crohn's disease flare.    EGD  Impression  The esophagus appeared normal.  Mild erythematous mucosa in the antrum; performed cold forceps biopsy  The duodenal bulb, 1st part of the duodenum and 2nd part of the duodenum appeared normal.    Colonoscopy  Findings  Mild, localized erythematous mucosa in the terminal ileum, consistent with Crohn's disease; no bleeding was identified; performed cold forceps biopsy;  Patchy cobblestone, edematous, erythematous, friable and ulcerated mucosa in the cecum, ascending colon, transverse colon, descending colon and sigmoid colon, consistent with Crohn's disease; performed cold forceps biopsy. Scattered ulcerations with mucosal cobblestoning, severe ulcerations from the cecum to the transverse colon and mild ulcerations in the descending and sigmoid colon.  The rectum appeared to be spared.  There were pseudopolyps with associated mucosal deformity from the sigmoid to cecum.  Random biopsies obtained from the right, transverse, left colon, and rectum.; SES-CD scores: rectum 1, sigmoid/descending colon 3, transverse colon 8, ascending colon 8, ileum 1, total score 21  Internal medium (grade 2) hemorrhoids observed during retroflexion; no bleeding was identified    Recommendations:  - Trend CBCdaily, transfuse for Hgb < 7.0 or platelets < 50k or in the setting of hemodynamic instability in the setting of active hemorrhage.   - Will follow-up on fecal calprotectin  - Will follow-up MRI A/P to evaluate for possible fistula in the setting of asuncion-anal disease  - Will follow-up on gastric biopsies and colonic biopsies  - Continue methylprednisolone 20 mg IV TID  - May discontinue pantoprazole 40 mg IV BID as no evidence of current or recent UGIB present on EGD  - Continue antibiotics per Primary Team    Billy Coburn MD  Gastroenterology Fellow

## 2024-09-09 NOTE — PROGRESS NOTES
Dave Alfaro is a 29 y.o. male on day 1 of admission presenting with Crohn's colitis, without complications (Multi).    Transitional Care Coordination Progress Note:    TCC attempt to meet with patient for initial assessment patient of the unit, TCC will assess at a later time.    Patient discussed during interdisciplinary rounds.   Team members present: MD & TCC  Plan per Medical/Surgical team: Patient admitted for a crohns flare w/ rectal bleeding and pain.  Patient havin EGD, Colonoscopy and MRI.  Payor: [facility]   Discharge disposition: Home no needs  Potential Barriers: None   ADOD: 9/11    TCC will continue to follow and update the plan as warranted.    CESILIA OlsonN-RN  Transitional Care Coordinator (TCC)  [phone] ext [phone]

## 2024-09-10 ENCOUNTER — APPOINTMENT (OUTPATIENT)
Dept: RADIOLOGY | Facility: HOSPITAL | Age: 29
End: 2024-09-10
Payer: COMMERCIAL

## 2024-09-10 LAB
ALBUMIN SERPL BCP-MCNC: 3.7 G/DL (ref 3.4–5)
ANION GAP SERPL CALC-SCNC: 15 MMOL/L (ref 10–20)
BACTERIA SPEC CULT: ABNORMAL
BUN SERPL-MCNC: 11 MG/DL (ref 6–23)
CALCIUM SERPL-MCNC: 9.1 MG/DL (ref 8.6–10.6)
CHLORIDE SERPL-SCNC: 100 MMOL/L (ref 98–107)
CO2 SERPL-SCNC: 26 MMOL/L (ref 21–32)
CREAT SERPL-MCNC: 0.69 MG/DL (ref 0.5–1.3)
EGFRCR SERPLBLD CKD-EPI 2021: >90 ML/MIN/1.73M*2
GLUCOSE BLD MANUAL STRIP-MCNC: 105 MG/DL (ref 74–99)
GLUCOSE BLD MANUAL STRIP-MCNC: 107 MG/DL (ref 74–99)
GLUCOSE BLD MANUAL STRIP-MCNC: 121 MG/DL (ref 74–99)
GLUCOSE BLD MANUAL STRIP-MCNC: 121 MG/DL (ref 74–99)
GLUCOSE SERPL-MCNC: 104 MG/DL (ref 74–99)
GRAM STN SPEC: ABNORMAL
GRAM STN SPEC: ABNORMAL
MAGNESIUM SERPL-MCNC: 2.38 MG/DL (ref 1.6–2.4)
PHOSPHATE SERPL-MCNC: 3 MG/DL (ref 2.5–4.9)
POTASSIUM SERPL-SCNC: 3.8 MMOL/L (ref 3.5–5.3)
SODIUM SERPL-SCNC: 137 MMOL/L (ref 136–145)

## 2024-09-10 PROCEDURE — 80069 RENAL FUNCTION PANEL: CPT

## 2024-09-10 PROCEDURE — 82947 ASSAY GLUCOSE BLOOD QUANT: CPT

## 2024-09-10 PROCEDURE — 72195 MRI PELVIS W/O DYE: CPT

## 2024-09-10 PROCEDURE — 1100000001 HC PRIVATE ROOM DAILY

## 2024-09-10 PROCEDURE — 83735 ASSAY OF MAGNESIUM: CPT

## 2024-09-10 PROCEDURE — 72195 MRI PELVIS W/O DYE: CPT | Performed by: RADIOLOGY

## 2024-09-10 PROCEDURE — 2500000004 HC RX 250 GENERAL PHARMACY W/ HCPCS (ALT 636 FOR OP/ED)

## 2024-09-10 PROCEDURE — 36415 COLL VENOUS BLD VENIPUNCTURE: CPT

## 2024-09-10 PROCEDURE — 99232 SBSQ HOSP IP/OBS MODERATE 35: CPT

## 2024-09-10 PROCEDURE — 2500000004 HC RX 250 GENERAL PHARMACY W/ HCPCS (ALT 636 FOR OP/ED): Mod: JZ

## 2024-09-10 PROCEDURE — 82657 ENZYME CELL ACTIVITY: CPT

## 2024-09-10 RX ADMIN — METHYLPREDNISOLONE SODIUM SUCCINATE 20 MG: 125 INJECTION INTRAMUSCULAR; INTRAVENOUS at 14:01

## 2024-09-10 RX ADMIN — METHYLPREDNISOLONE SODIUM SUCCINATE 20 MG: 125 INJECTION INTRAMUSCULAR; INTRAVENOUS at 20:11

## 2024-09-10 RX ADMIN — METHYLPREDNISOLONE SODIUM SUCCINATE 20 MG: 125 INJECTION INTRAMUSCULAR; INTRAVENOUS at 05:42

## 2024-09-10 RX ADMIN — PANTOPRAZOLE SODIUM 40 MG: 40 INJECTION, POWDER, FOR SOLUTION INTRAVENOUS at 20:11

## 2024-09-10 RX ADMIN — CEFTRIAXONE SODIUM 1 G: 1 INJECTION, SOLUTION INTRAVENOUS at 05:42

## 2024-09-10 RX ADMIN — METRONIDAZOLE 500 MG: 5 INJECTION, SOLUTION INTRAVENOUS at 04:27

## 2024-09-10 RX ADMIN — METRONIDAZOLE 500 MG: 5 INJECTION, SOLUTION INTRAVENOUS at 20:11

## 2024-09-10 RX ADMIN — METRONIDAZOLE 500 MG: 5 INJECTION, SOLUTION INTRAVENOUS at 14:02

## 2024-09-10 RX ADMIN — PANTOPRAZOLE SODIUM 40 MG: 40 INJECTION, POWDER, FOR SOLUTION INTRAVENOUS at 08:35

## 2024-09-10 ASSESSMENT — COGNITIVE AND FUNCTIONAL STATUS - GENERAL
DAILY ACTIVITIY SCORE: 24
MOBILITY SCORE: 24

## 2024-09-10 ASSESSMENT — PAIN - FUNCTIONAL ASSESSMENT: PAIN_FUNCTIONAL_ASSESSMENT: 0-10

## 2024-09-10 ASSESSMENT — PAIN SCALES - GENERAL
PAINLEVEL_OUTOF10: 0 - NO PAIN
PAINLEVEL_OUTOF10: 0 - NO PAIN

## 2024-09-10 NOTE — PROGRESS NOTES
Dave Alfaro is a 29 y.o. male on day 2 of admission presenting with Acute Crohn's disease with abscess (Multi).    Transitional Care Coordination Progress Note:     TCC 2nd attempt to meet with patient for initial assessment patient of the unit, TCC will assess at a later time.     Patient discussed during interdisciplinary rounds.   Team members present: MD & TCC  Plan per Medical/Surgical team: Patient admitted for a crohns flare w/ rectal bleeding and pain.  Patient  still need MRI & Tissue biopsy started on steroid therapy  Payor: [facility]   Discharge disposition: Home no needs  Potential Barriers: None   ADOD: New 9/13     TCC will continue to follow and update the plan as warranted.

## 2024-09-10 NOTE — CARE PLAN
Problem: Safety - Adult  Goal: Free from fall injury  Outcome: Progressing   The patient's goals for the shift include      The clinical goals for the shift include patient will have no complaints of bloody bowel movement throughout the shift 9/9/24 1900    Over the shift, the patient did not make progress toward the following goals.

## 2024-09-10 NOTE — CONSULTS
"Nutrition Initial Assessment:   Nutrition Assessment    Reason for Assessment: Admission nursing screening    Patient is a 29 y.o. male presenting with PMH Crohn's disease and arthritis presenting with a chief complaint of rectal bleeding. High suspicion for Crohn's flare given similar pain as in prior flares and absence of biologic treatment for 7-8 months     Malnutrition Screening Tool (MST)  Have you recently lost weight without trying?: Unsure  Weight Loss Score: 2  Have you been eating poorly because of a decreased appetite?: No  Malnutrition Score: 2   Nutrition History:  Energy Intake: Good > 75 %  Food and Nutrient History: Received consult for MST score of 2- Pt reported unsure weight loss. RDN attempted to see Pt on 9/9 Pt was off floor for EGD, RDN try to meet with Pt on 9/10 (x 3 attempts) Pt off unit during attempts, information obtain from chart review. Per chart review Pt has been advanced to regular diet and is tolerating regular diet well, reported symptoms have improved. Having less BM, no melena reported. N/V/D has improved. No other nutrition concerns at this time.  Food Allergies/Intolerances:   Red dye  GI Symptoms: Diarrhea, Nausea, and Vomiting- improved   Oral Problems: None       Anthropometrics:  Height: 172.7 cm (5' 7.99\")   Weight: 63.5 kg (140 lb)   BMI (Calculated): 21.29  IBW/kg (Dietitian Calculated): 70 kg  Percent of IBW: 91 %       Weight History:   Wt Readings from Last 20 Encounters:   09/07/24 63.5 kg (140 lb)   12/13/22 57.8 kg (127 lb 6.4 oz)   02/23/21 66.2 kg (146 lb)   08/07/20 59 kg (130 lb 1.1 oz)      Weight Change %:  Weight History / % Weight Change: Limited documented weights to assess recent weight changes. Per documented weights no significant weight loss noted.  Significant Weight Loss: No    Nutrition Focused Physical Exam Findings:  defer: will perform complete NFPE on follow up - Pt unavailable during attempts   Subcutaneous Fat Loss:   Orbital Fat Pads: " Mild-Moderate (slight dark circles and slight hollowing)  Buccal Fat Pads: Well nourished (full, rounded cheeks)  Muscle Wasting:  Temporalis: Mild-Moderate (slight depression)  Pectoralis (Clavicular Region): Well nourished (clavicle not visible)  Edema:  Edema: none  Physical Findings:  Skin: Negative    Nutrition Significant Labs:  BMP Trend:   Results from last 7 days   Lab Units 09/10/24  0831 09/09/24  1806 09/09/24  0725 09/08/24  1828   GLUCOSE mg/dL 104* 106* 120* 134*   CALCIUM mg/dL 9.1 8.9 9.0 8.9   SODIUM mmol/L 137 137 138 134*   POTASSIUM mmol/L 3.8 3.8 3.6 3.0*   CO2 mmol/L 26 27 26 23   CHLORIDE mmol/L 100 101 101 100   BUN mg/dL 11 10 8 7   CREATININE mg/dL 0.69 0.68 0.77 0.78    , BG POCT trend:   Results from last 7 days   Lab Units 09/10/24  1135 09/10/24  0817 09/09/24  2107 09/09/24  1607 09/09/24  0728   POCT GLUCOSE mg/dL 107* 121* 116* 124* 130*        Nutrition Specific Medications:  All med's reviewed     I/O:   Last BM Date: 09/08/24; Stool Appearance: Bloody, Other (Comment) (per patient, states notified provider. small amount) (09/10/24 0900)    Dietary Orders (From admission, onward)       Start     Ordered    09/10/24 0937  Adult diet Regular  Diet effective now        Question:  Diet type  Answer:  Regular    09/10/24 0936    09/08/24 1726  May Participate in Room Service  Once        Question:  .  Answer:  Yes    09/08/24 1728                     Estimated Needs:   Total Energy Estimated Needs (kCal): 1900 kCal (1900-2000kcal)  Method for Estimating Needs: 30kcal/kg of ABW  Total Protein Estimated Needs (g): 76 g  Method for Estimating Needs: 1.2 gm/kg of ABW              Nutrition Diagnosis   Malnutrition Diagnosis  Patient has Malnutrition Diagnosis: No    Nutrition Diagnosis  Patient has Nutrition Diagnosis: Yes  Diagnosis Status (1): New  Nutrition Diagnosis 1: Altered GI function  Related to (1): Crohn's Disease  As Evidenced by (1): increased energy and protein needs, nausea,  melena and diarrhea       Nutrition Interventions/Recommendations         Nutrition Prescription:  Individualized Nutrition Prescription Provided for : 3502-2716 kcal, 76gm PRO        Nutrition Interventions:   Interventions: Meals and snacks  Goal: Continue adult diet regular as tolerated and per team  No other interventions indicated at this time. RDN will continue to follow and monitor Po intake and diet tolerance     Continue to document meal intake percent- Thank you!      Nutrition Education: N/A         Nutrition Monitoring and Evaluation   Food/Nutrient Related History Monitoring  Monitoring and Evaluation Plan: Energy intake, Amount of food  Energy Intake: Estimated energy intake  Criteria: >75% of energy needs met via PO  Amount of Food: Estimated amout of food  Criteria: >50% of meals    Body Composition/Growth/Weight History  Monitoring and Evaluation Plan: Weight  Criteria: no sig weight changes                   Time Spent (min): 45 minutes

## 2024-09-10 NOTE — PROGRESS NOTES
MEDICINE INPATIENT PROGRESS NOTE    Dave Alfaro is a 29 y.o. male on hospital day 2 who presented with Acute Crohn's disease with abscess (Multi)    Subjective   NAEO. Underwent EGD and colonoscopy yesterday. EGD with normal esophagus and duodenum, mild erythematous mucosa in the antrum with cold forceps biopsy performed. Colonoscopy with evidence of active Crohn's disease and medium (grade 2) hemorrhoids without active bleeding. Started on full liquid diet yesterday, no n/v/abdominal pain with diet. He thinks his symptoms have improved from presentation. BMs have decreased in frequency, only 2 episodes of bright red bloody diarrhea since colonoscopy.  Methylpred 20 mg TID started 48 hours ago. Endorses abdominal cramping. Denies fever, chills, chest pain, lightheadedness, palpitations.       Objective     Last Recorded Vitals  Vitals:    09/09/24 1530 09/09/24 1614 09/09/24 2105 09/10/24 0508   BP: 95/58 104/55 117/73 113/72   Pulse: 53 53 62 51   Resp: 18 17 18 17   Temp:  36.4 °C (97.5 °F) 36.7 °C (98.1 °F) 36.1 °C (97 °F)   TempSrc:       SpO2: 99% 99% 97% 99%   Weight:       Height:           Intake/Output last 3 Shifts:  I/O last 3 completed shifts:  In: 50 (0.8 mL/kg) [IV Piggyback:50]  Out: - (0 mL/kg)   Weight: 63.5 kg     Physical Exam  Constitutional: Patient does not appear to be in any acute distress, AOx4  HEENT: EOMI, normal external inspection of ears and nose. Oropharynx normal.  Cardio: RRR, S1/S2, no murmurs, rubs, or gallops  Pulm: CTAB, no respiratory distress.  GI: +BS, soft, non-tender, nondistended, no guarding or rebound, no masses noted  MSK: No joint swelling, normal movements of all extremities  Skin: No lesions, contusions, or erythema.  Extremities: no BLE swelling   Neuro: No focal deficits  Psych: Appropriate mood and behavior    Labs    CBC  Results from last 72 hours   Lab Units 09/09/24  0725 09/08/24  0702 09/07/24  2321   HEMOGLOBIN g/dL 11.4* 11.9* 14.2   HEMATOCRIT %  34.2* 33.5* 40.2*   WBC AUTO x10*3/uL 13.5* 13.2* 11.9*   PLATELETS AUTO x10*3/uL 349 309 348      BMP  Results from last 72 hours   Lab Units 09/10/24  0831 09/09/24  1806 09/09/24  0725 09/08/24  1828   SODIUM mmol/L 137 137 138 134*   POTASSIUM mmol/L 3.8 3.8 3.6 3.0*   CHLORIDE mmol/L 100 101 101 100   CO2 mmol/L 26 27 26 23   BUN mg/dL 11 10 8 7   CREATININE mg/dL 0.69 0.68 0.77 0.78   MAGNESIUM mg/dL 2.38  --  2.15 2.05   PHOSPHORUS mg/dL 3.0 3.7 3.1 3.4   ANION GAP mmol/L 15 13 15 14   GLUCOSE mg/dL 104* 106* 120* 134*   CALCIUM mg/dL 9.1 8.9 9.0 8.9     LFTS  Results from last 72 hours   Lab Units 09/07/24  2321   ALT U/L 5*   AST U/L 12   ALK PHOS U/L 61   BILIRUBIN TOTAL mg/dL 0.5     COAGS  Results from last 72 hours   Lab Units 09/09/24  0725   INR  1.1      Wound cx 9/8: few gram positive cocci, abundant PMNs    Bcx obtained 9/8: NGTD x2 day    Imaging  CT abdomen pelvis wo IV contrast: 9/8/2024  There is colonic wall thickening extending from the proximal ascending colon to the distal descending colon with mild pericolonic inflammatory stranding. Findings concerning for infectious/inflammatory colitis. No pneumatosis or portal venous gas. No evidence for free air as clinically question. There is asymmetric soft tissue thickening along the right medial intergluteal fold with question some subtle adjacent stranding. Evaluation is limited on this unenhanced CT. Given patient's history of Crohn's disease findings concerning for a fistulous tract with developing phlegmonous changes. Correlate with symptomatology and dedicated exam.       GI    EGD 9/9/2024  The esophagus appeared normal.  Mild erythematous mucosa in the antrum; performed cold forceps biopsy  The duodenal bulb, 1st part of the duodenum and 2nd part of the duodenum appeared normal.    Colonoscopy 9/9/2024  Mild erythematous mucosa in the terminal ileum, consistent with Crohn's disease; performed cold forceps biopsy  Cobblestone, edematous,  erythematous, friable, ulcerated mucosa in the cecum, ascending colon, transverse colon, descending colon and sigmoid colon, consistent with Crohn's disease; performed cold forceps biopsy  Medium (grade 2) hemorrhoids    Medications   Scheduled Medications  cefTRIAXone, 1 g, intravenous, q24h  methylPREDNISolone sodium succinate (PF), 20 mg, intravenous, q8h  metroNIDAZOLE, 500 mg, intravenous, q8h  pantoprazole, 40 mg, intravenous, BID     Continuous Medications    PRN Medications  PRN medications: ondansetron, polyethylene glycol-electrolytes          Assessment/Plan   Dave Alfaro is a 29 y.o. male with PMH Crohn's disease and arthritis presenting with a chief complaint of rectal bleeding. High suspicion for Crohn's flare given similar pain as in prior flares and absence of biologic treatment for 7-8 months. Hemodynamically stable in ED but will continue to watch closely. Pt continued to have bright red bloody BM in ED. Patient placed on steroids and IV PPI. S/p colonoscopy with evidence of active Crohn's flare. Frequently of BMs improving since presentation but still with bright red blood, will continue IV steroids. Colorectal surgery following for abscess drainage, will continue CTX and flagyl pending wound culture results.    Updates 9/10/24  - Continue IV methylpred 20mg q8  - Continue protonix 40mg BID  - Continue CTX + flagyl  - Follow up wound culture to guide antibiotic selection  - Pelvis MRI today  - Advance diet to adult regular    #LGIB  #Crohn's disease  - 4-5 day history of diarrhea, patient presented to ED 9/8 due to onset of bright red stool  - Suspect LGIB likely from Crohn's disease flare  - Pt known to develop rectal bleeding and pain with flares  - Last Humira 7-8 months ago  - Elevated CRP and ESR  - CT inflammatory findings likely from active Crohn's  - Stool pathogen panel and C. Diff from 9/8/24 both negative  Plan:  - GI consulted, appreciate recs  - Continue IV methylpred 20mg q8  -  IV protonix 40mg BID  - 2x blood cultures  - Hep B/C + T spot for biologic re initiation as outpatient  - Daily CBC  - Active T&S, consent for blood on file 9/8     #Perirectal/anal abscesses  - CT found possible fistulous tract formation with perirectal/perianal fluid collections  - ACS performed I&D in ED  - Pt reports pain relief after procedure  - CTX and flagyl started  - Wound cx 9/8/24: abundant polymorphonuclear leukocytes, few gram positive cocci  Plan:  - Colorectal surgery following, appreciate recs  - Continue CTX and Flagyl  - Rectal MRI  - Outpatient follow up with Dr. Eckert to follow up on MRI and discuss fistula management     #Right chest pain  #Right shoulder pain  - Low concern for ACS based on EKG  - Pain is reproducible with movement of right shoulder  - Possibly arthritic extraintestinal manifestation of Crohn's disease  - No shoulder MRI per chart review  Plan:  - PRN EKG for ACS symptoms  - Consider trops if pain is persistent  - Outpatient follow up with orthopedics     #Hypokalemia  - K 3.2 in ED, given 40 mEq and now improving to 3.6  - Likely 2/2 to diarrhea and GI losses  - Possible EKG changes, T wave inversions noted on EKG but may be chronic  Plan:  - Ordered additional 20 mEq potassium  - Daily BMP  - Continue to replete as needed     F: PRN  E: Replete for K>4, Mg>2, phos>3  N: Adult diet Regular   A: 2 pIVs     Oxygen: none  Abx: cefTRIAXone - 1 gram/50 mL  metroNIDAZOLE - 500 mg/100 mL   Gtts: none     DVT ppx: SCDs, holding in setting of GI bleed  GI ppx: protonix  Bowel regimen: none    Code Status: Full Code (confirmed on admission)  Surrogate Medical Decision-maker: Elda Perdomo (Parent) [phone]         Linda Mata MD  Internal Medicine, PGY-1  Please Haiku with any questions or concerns.

## 2024-09-10 NOTE — DISCHARGE INSTRUCTIONS
Dear Dave Alfaro,    You were admitted to [facility] from 9/7/2024  to 9/11/2024 for bloody stool and rectal pain. In the ED, you were found to have a rectal abscess, which can be caused by Crohn's disease. This was drained by the surgery team, which improved your pain. For the blood in your stool, you were started on IV steroids to help treat a likely Crohn's flare. You had two procedures to look at your GI tract, an EGD and colonoscopy, which showed that this was a Crohn's flare. 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 At the time of your discharge, your symptoms had improved with IV steroids.    Going home, you should complete a prednisone taper (details below) to help with your symptoms of Crohn's disease. You should also follow up with your GI doctor (Dr. Ibarra) to restart Humira to treat Crohn's disease. Follow up with colorectal surgery to discuss management of your fistula. You will be sent home with 4 days of antibiotics (below) to finish treatment for your abscess, and resuming treatment for Crohn's disease should reduce the risk that this occurs again.    Medication changes:  Start prednisone taper. Take 40 mg (4 pills) for 1 week, then decrease by 5 mg per week (35 mg, then 30 mg, then 25 mg). You will have to cut one pill in half for the correct dosage every other week.  Take Ciprofloxacin 750 mg twice daily for 4 days  Take Metronidazole (Flagyl) 500 mg three times per day for 4 days    Appointments/follow-up:  Follow up with Dr. Ibarra (GI) to discuss restarting Humira. You should receive a call from central scheduling in the next few days. If you do not receive a call within 3-5 business days, please call [phone] to schedule your appointment, or schedule on [facility].    It was a pleasure taking care of you,    Your  Care Team

## 2024-09-11 ENCOUNTER — PHARMACY VISIT (OUTPATIENT)
Dept: PHARMACY | Facility: CLINIC | Age: 29
End: 2024-09-11
Payer: MEDICAID

## 2024-09-11 VITALS
HEIGHT: 68 IN | TEMPERATURE: 99.3 F | OXYGEN SATURATION: 99 % | DIASTOLIC BLOOD PRESSURE: 70 MMHG | HEART RATE: 87 BPM | WEIGHT: 140 LBS | SYSTOLIC BLOOD PRESSURE: 106 MMHG | RESPIRATION RATE: 18 BRPM | BODY MASS INDEX: 21.22 KG/M2

## 2024-09-11 LAB
CALPROTECTIN STL-MCNT: 747 UG/G
GLUCOSE BLD MANUAL STRIP-MCNC: 108 MG/DL (ref 74–99)
GLUCOSE BLD MANUAL STRIP-MCNC: 130 MG/DL (ref 74–99)
LACTOFERRIN STL QL IA: POSITIVE

## 2024-09-11 PROCEDURE — 2500000004 HC RX 250 GENERAL PHARMACY W/ HCPCS (ALT 636 FOR OP/ED)

## 2024-09-11 PROCEDURE — 82947 ASSAY GLUCOSE BLOOD QUANT: CPT

## 2024-09-11 PROCEDURE — 2500000004 HC RX 250 GENERAL PHARMACY W/ HCPCS (ALT 636 FOR OP/ED): Mod: JZ

## 2024-09-11 PROCEDURE — 86481 TB AG RESPONSE T-CELL SUSP: CPT

## 2024-09-11 PROCEDURE — 99238 HOSP IP/OBS DSCHRG MGMT 30/<: CPT

## 2024-09-11 PROCEDURE — RXMED WILLOW AMBULATORY MEDICATION CHARGE

## 2024-09-11 PROCEDURE — 36415 COLL VENOUS BLD VENIPUNCTURE: CPT

## 2024-09-11 RX ORDER — PREDNISONE 20 MG/1
40 TABLET ORAL DAILY
Status: DISCONTINUED | OUTPATIENT
Start: 2024-09-11 | End: 2024-09-11 | Stop reason: HOSPADM

## 2024-09-11 RX ORDER — CIPROFLOXACIN 500 MG/1
750 TABLET ORAL 2 TIMES DAILY
Qty: 12 TABLET | Refills: 0 | Status: SHIPPED | OUTPATIENT
Start: 2024-09-11 | End: 2024-09-15

## 2024-09-11 RX ORDER — METRONIDAZOLE 500 MG/1
500 TABLET ORAL 3 TIMES DAILY
Qty: 12 TABLET | Refills: 0 | Status: SHIPPED | OUTPATIENT
Start: 2024-09-11 | End: 2024-09-15

## 2024-09-11 RX ORDER — PREDNISONE 10 MG/1
TABLET ORAL
Qty: 122 TABLET | Refills: 0 | Status: SHIPPED | OUTPATIENT
Start: 2024-09-11 | End: 2024-11-05

## 2024-09-11 RX ADMIN — CEFTRIAXONE SODIUM 1 G: 1 INJECTION, SOLUTION INTRAVENOUS at 04:52

## 2024-09-11 RX ADMIN — PANTOPRAZOLE SODIUM 40 MG: 40 INJECTION, POWDER, FOR SOLUTION INTRAVENOUS at 08:44

## 2024-09-11 RX ADMIN — METRONIDAZOLE 500 MG: 5 INJECTION, SOLUTION INTRAVENOUS at 04:52

## 2024-09-11 RX ADMIN — PREDNISONE 40 MG: 20 TABLET ORAL at 14:12

## 2024-09-11 RX ADMIN — METHYLPREDNISOLONE SODIUM SUCCINATE 20 MG: 125 INJECTION INTRAMUSCULAR; INTRAVENOUS at 04:52

## 2024-09-11 RX ADMIN — METRONIDAZOLE 500 MG: 5 INJECTION, SOLUTION INTRAVENOUS at 14:12

## 2024-09-11 ASSESSMENT — ACTIVITIES OF DAILY LIVING (ADL): LACK_OF_TRANSPORTATION: NO

## 2024-09-11 ASSESSMENT — PAIN SCALES - GENERAL: PAINLEVEL_OUTOF10: 0 - NO PAIN

## 2024-09-11 NOTE — PROGRESS NOTES
09/11/24 0927   Discharge Planning   Living Arrangements Spouse/significant other   Support Systems Spouse/significant other;Parent   Assistance Needed Patient is independent with ADL's   Type of Residence Private residence   Number of Stairs to Enter Residence 5   Number of Stairs Within Residence 12   Do you have animals or pets at home? Yes   Type of Animals or Pets Dog   Who is requesting discharge planning? Provider   Home or Post Acute Services None   Expected Discharge Disposition Home   Does the patient need discharge transport arranged? No   Financial Resource Strain   How hard is it for you to pay for the very basics like food, housing, medical care, and heating? Not hard   Housing Stability   In the last 12 months, was there a time when you were not able to pay the mortgage or rent on time? N   At any time in the past 12 months, were you homeless or living in a shelter (including now)? N   Transportation Needs   In the past 12 months, has lack of transportation kept you from medical appointments or from getting medications? no   In the past 12 months, has lack of transportation kept you from meetings, work, or from getting things needed for daily living? No     Assessment Note:  Met with patient and Introduced myself as care coordinator and member of the Care Transitions team for discharge planning. Patient lives in a single family home with 5 steps for entry with Sign Other and two minor children. Pt feels safe at home.   Transportation: Patient drives to appt patient family will transport home at discharge  Pharmacy: [facility]  DME: None  Previous home care: None  Falls: no recent falls  PCP: Need a PCP    TCC will continue to follow and update the plan as warranted.    CESILIA OlsonN-RN  Transitional Care Coordinator (TCC)  [phone] ext [phone]

## 2024-09-11 NOTE — CARE PLAN
Problem: Nutrition  Goal: Less than 5 days NPO/clear liquids  Outcome: Progressing  Goal: Oral intake greater than 50%  Outcome: Progressing  Goal: Oral intake greater 75%  Outcome: Progressing  Goal: Consume prescribed supplement  Outcome: Progressing  Goal: Adequate PO fluid intake  Outcome: Progressing  Goal: Nutrition support goals are met within 48 hrs  Outcome: Progressing  Goal: Nutrition support is meeting 75% of nutrient needs  Outcome: Progressing  Goal: Tube feed tolerance  Outcome: Progressing  Goal: BG  mg/dL  Outcome: Progressing  Goal: Lab values WNL  Outcome: Progressing  Goal: Electrolytes WNL  Outcome: Progressing  Goal: Promote healing  Outcome: Progressing  Goal: Maintain stable weight  Outcome: Progressing  Goal: Reduce weight from edema/fluid  Outcome: Progressing  Goal: Gradual weight gain  Outcome: Progressing  Goal: Improve ostomy output  Outcome: Progressing   The patient's goals for the shift include      The clinical goals for the shift include tolerate foods better

## 2024-09-11 NOTE — DISCHARGE SUMMARY
Discharge Diagnosis  Acute Crohn's disease with abscess (Multi)    Issues Requiring Follow-Up  - Complete prednisone taper. 40 mgs for 1 week, then decrease by 5 mg per week  - Ciprofloxacin 750 mg BID for 4 days  - Metronidazone 500 mg TID for 4 days  - Follow up with Dr. Ibarra to discuss restarting Humira    Discharge Meds     Medication List      START taking these medications     ciprofloxacin 500 mg tablet; Commonly known as: Cipro; Take 1.5 tablets   (750 mg) by mouth 2 times a day for 4 days.   metroNIDAZOLE 500 mg tablet; Commonly known as: Flagyl; Take 1 tablet   (500 mg) by mouth 3 times a day for 4 days.   predniSONE 10 mg tablet; Commonly known as: Deltasone; Take 4 tablets   (40 mg) by mouth once daily for 6 days, THEN 3.5 tablets (35 mg) once   daily for 7 days, THEN 3 tablets (30 mg) once daily for 7 days, THEN 2.5   tablets (25 mg) once daily for 7 days, THEN 2 tablets (20 mg) once daily   for 7 days, THEN 1.5 tablets (15 mg) once daily for 7 days, THEN 1 tablet   (10 mg) once daily for 7 days, THEN 0.5 tablets (5 mg) once daily for 7   days.; Start taking on: September 11, 2024     STOP taking these medications     Humira(CF) Pen 40 mg/0.4 mL pen injector kit pen-injector; Generic drug:   adalimumab       Test Results Pending At Discharge  Pending Labs       Order Current Status    Calprotectin Stool In process    Surgical Pathology Exam In process    T-Spot TB In process    TPMT Enzymes In process    Blood Culture Preliminary result    Blood Culture Preliminary result            Hospital Course  Dave Alfaro is a 29 y.o. male with PMH Crohn's disease and arthritis presenting with a chief complaint of rectal bleeding.     In the ED, patient was treated for suspected Crohn's flare. IV methylprednisolone 20mg q8 started, IV protonix 80mg given. 1L LR bolus given persistent diarrhea and fluid losses. 0.5mg IV Dilaudid for pain control. Patient had an additional bright red bloody BM in the ED. CT A/P  noted likely fistulous tract formation along right intergluteal fold. ACS was consulted for perirectal/perianal abscess noted on exam, performed I&D which relieved rectal pain. Patient started on ceftriaxone and metronidazole.     EGD and colonoscopy completed 9/9/2024. EGD showed normal appearance of esophagus, mild patchy erythematous mucosa in the antrum, and normal appearance of duodenum. Biopsy obtained to rule out H. Pylori. Colonoscopy showed mild erythematous mucosa in the terminal ileum, consistent with Crohn's disease. Cobblestone, edematous, erythematous, friable, ulcerated mucosa in the cecum, ascending colon, transverse colon, descending colon and sigmoid colon, consistent with Crohn's disease. Biopsy obtained. Medium (grade 2) hemorrhoids present. Pelvic MRI wo contrast showed asymmetric thickening of the right intergluteal cleft from the posterior rectal canal to the skin with linear T2 hyperintensity/diffusion restriction suggestive of fistulous connection.    At the time of discharge, patient reported symptomatic improvement after 72 hours of IV methylpred 20 mg TID. He notes that he is still having about 5 Bms per day, but these are no longer bloody and his abdominal pain and cramping has resolved. He was able to tolerate a regular diet without symptom exacerbation. Rectal abscess with no pain since I&D, wound cultures only yielded gram positive cocci with rare mixed skin microorganisms.    Patient referred to follow up with Dr. Ibarra to plan for restarting Humira and follow up of biopsy results. He also has follow up scheduled with colorectal surgery to discuss fistula management. Discharged on prednisone taper 40 mg PO daily, decrease by 5 mg every week. Prescribed 4-day course of Cipro/flagyl to complete 7 days of antibiotics for rectal abscess.    Pertinent Physical Exam At Time of Discharge  Constitutional: Patient does not appear to be in any acute distress, AOx4  HEENT: EOMI, normal external  inspection of ears and nose. Oropharynx normal.  Cardio: RRR, S1/S2, no murmurs, rubs, or gallops  Pulm: CTAB, no respiratory distress.  GI: +BS, soft, non-tender, nondistended, no guarding or rebound, no masses noted  MSK: No joint swelling, normal movements of all extremities  Skin: No lesions, contusions, or erythema.  Extremities: no BLE swelling   Neuro: No focal deficits  Psych: Appropriate mood and behavior    Outpatient Follow-Up  Future Appointments   Date Time Provider Department Center   10/24/2024 11:00 AM Linda Acevedo MD [id number]         Linda Mata MD

## 2024-09-11 NOTE — NURSING NOTE
9/11/2024 1535 Discharge Note  Patient discharged home with no needs. Discharge instructions discussed with patient, verbalized understanding. Meds to beds delivered to bedside. Aware of follow up appointments needed. Peripheral IV discontinued prior to discharge. Belongings gathered per patient. Refused transport and ambulated off unit at 1535. ---- Bonnie Anand RN

## 2024-09-12 LAB
BACTERIA BLD CULT: NORMAL
BACTERIA BLD CULT: NORMAL

## 2024-09-13 LAB
NIL(NEG) CONTROL SPOT COUNT: NORMAL
PANEL A SPOT COUNT: 0
PANEL B SPOT COUNT: 0
POS CONTROL SPOT COUNT: NORMAL
T-SPOT. TB INTERPRETATION: NEGATIVE
TPMT BLD-CCNC: 28.8 U/ML (ref 24–44)

## 2024-09-16 ENCOUNTER — TELEPHONE (OUTPATIENT)
Dept: GASTROENTEROLOGY | Facility: HOSPITAL | Age: 29
End: 2024-09-16

## 2024-09-17 LAB
LABORATORY COMMENT REPORT: NORMAL
PATH REPORT.FINAL DX SPEC: NORMAL
PATH REPORT.GROSS SPEC: NORMAL
PATH REPORT.TOTAL CANCER: NORMAL

## 2024-10-14 NOTE — PROGRESS NOTES
"REASON FOR VISIT:  Crohn's disease    HPI:  Dave Alfaro is a 29 y.o. male who presents for follow-up.  Last seen in office by CNP 12/2022.  Colonic and perianal Crohns disease diagnosed in 2011 after presenting with abdominal pain and rectal bleeding. The disease was primarily involving in the ascending colon endoscopically and the terminal ileum radiologically, but bowel symptoms were minimal and historically predominant symptoms joint and back pain due to sacroiliitis along with some peripheral arthritis.      Other significant medical Hx: Bipolar Disorder, DELMIS, Vitamin D Deficiency     -2011-12 Remicade with an initial response but later developed antibodies.   -2012 changed to Humira every 2 weeks, later escalated to weekly dosing (level 18).   -9/2014 patient felt Humira not helpful and it was briefly stopped; 6MP was begun. However,   hospitalized in November with rectal bleeding and mild abdominal discomfort and Humira restarted.  -5/2015 added oral MTX 10 mg weekly, later stopped due to lack of effect.   -2015 to 5/2019 care at [facility] and maintained on weekly Humira without MTX. Hospitalized at [facility] 5/2016 with abdominal pain.   -5/2016 COLONOSCOPY [facility]: normal ileum with inflammation throughout colon with mild ulcer - most severe right colon with random biopsied showing active colitis with ulcer and regenerative changes (stain for CMV negative) No dysplasia. Rectum with focal active colitis.   6/1/2016 MRI ENTEROGRAPHY [facility] with active IBD involving cecum and proximal ascending colon. No active inflammatory small bowel disease. T.I normal. No strictures, fistula or abscess. Prednisone given.   5/2019 Anser ADA 9.6 and no antibodies   -8/2019 hospitalized with perianal pain. MRI showed perianal abscess and he had an EUA with I &D of left posterior intersphincteric abscess by Dr. Acevedo.      12/2022 reported pain in left lower quadrant at times and intermittent \"choking\" with eating worse over the " 4-5 months. Also, pain inside rectum all the time - similar to what he had in 2019. Moving bowels 2-3 times a day formed, non-bloody. Taking Humira weekly.     Admitted early 8/2024 with c/o nausea, emesis, diarrhea, and hematochezia. (+) asuncion-anal pain felt like abscesses forming.  Seen by surgery and had I&D of perianal abscess.  He had been off of adalimumab for approximately 7 months.      W/U:  -CT below  -EGD and colonoscopy completed 9/9/2024. EGD showed normal appearance of esophagus, mild patchy erythematous mucosa in the antrum, and normal appearance of duodenum. Colonoscopy showed mild erythematous mucosa in the terminal ileum, consistent with Crohn's disease. Cobblestone, edematous, erythematous, friable, ulcerated mucosa in the cecum, ascending colon, transverse colon, descending colon and sigmoid colon, consistent with Crohn's disease.  Medium (grade 2) hemorrhoids present. Path chronic gastritis without H. Pylori.  TI normal.  Colon cryptitis and crypt abscess, rectum spared.   -Pelvic MRI wo contrast showed asymmetric thickening of the right intergluteal cleft from the posterior rectal canal to the skin with linear T2 hyperintensity/diffusion restriction suggestive of fistulous connection.     Treated with IV steroids and improved.  Discharged on prednisone taper and oral Cipro/Flagyl.      In follow-up today,   Feeling okay. Since DC, GI sx have been relatively stable. DC’d having 5 BM/day, nonbloody, and minimal rectal pain. Currently having 2-3 formed, non bloody BM without abdominal or rectal pain. No nausea or vomiting, tolerating a varied diet well and notes increased appetite since starting prednisone. Does note some continued clear discharge from his I&D site, without pain or rash as far as he can tell. His biggest concern is his arthritic pain, consistent with his hx of predominantly arthritic compared to intestinal sx. His chronic lower back pain is severe and limited mobility, had  responded to the higher doses of prednisone after discharge but the last 2 weeks has slowly gotten worse. He does note that no previous treatments have resolved his arthritic pain, but when he was on humira in the past it has made things manageable. Additionally, he notes b/l ankle swelling and L sided ankle pain since starting steroids. Swelling is intermittent without warmth or rash, does limit his mobility in addition to his back pain.    He is looking to restart his Humira, noting that he is more motivated to maintain compliance. He does say that since stopped 1/2024, he was relatively asymptomatic until a few days before his admission 9/2024.     No other EIM of IBD.    No alcohol or tobacco use, smokes marijuana twice a day now. Trying to cut back since he knows it is negatively affecting his IBD.    REVIEW OF SYSTEMS    Allergies   Allergen Reactions    Iodinated Contrast Media Hives    Iodine Hives and Swelling    Methotrexate Unknown    Pramoxine-Hc-Chloroxylenol Unknown    Red Dye Hives    Salicylates Other     Pt. Reports causes bleeding issues    Sulfasalazine Rash and Swelling       Past Medical History:   Diagnosis Date    Crohn's disease, unspecified, without complications (Multi)     Crohn's disease    Hemorrhage of anus and rectum     Hemorrhage of rectum and anus    Melena     Hematochezia    Other conditions influencing health status     Arthritis    Other conditions influencing health status     Arthritis Of Inflammatory Bowel Disease    Pain in unspecified joint     Joint pain    Personal history of diseases of the blood and blood-forming organs and certain disorders involving the immune mechanism     History of iron deficiency anemia    Personal history of other diseases of the musculoskeletal system and connective tissue     History of backache    Personal history of other specified conditions     History of dizziness    Personal history of other specified conditions     History of abdominal  pain    Sacroiliitis, not elsewhere classified (CMS-HCC)     Sacroiliitis    Unspecified abdominal hernia without obstruction or gangrene     Hernia       Past Surgical History:   Procedure Laterality Date    COLONOSCOPY  12/11/2013    Complete Colonoscopy    HERNIA REPAIR  01/17/2014    Inguinal Hernia Repair    KNEE ARTHROSCOPY W/ DEBRIDEMENT  01/17/2014    Knee Arthroscopy (Therapeutic)    LYMPH NODE BIOPSY  04/08/2014    Biopsy Lymph Node    OTHER SURGICAL HISTORY  01/17/2014    Colonoscopy (Fiberoptic) Forceps Biopsy       Current Outpatient Medications   Medication Sig Dispense Refill    predniSONE (Deltasone) 10 mg tablet Take 4 tablets (40 mg) by mouth once daily for 6 days, THEN 3.5 tablets (35 mg) once daily for 7 days, THEN 3 tablets (30 mg) once daily for 7 days, THEN 2.5 tablets (25 mg) once daily for 7 days, THEN 2 tablets (20 mg) once daily for 7 days, THEN 1.5 tablets (15 mg) once daily for 7 days, THEN 1 tablet (10 mg) once daily for 7 days, THEN 0.5 tablets (5 mg) once daily for 7 days. 122 tablet 0     No current facility-administered medications for this visit.       PHYSICAL EXAM:  /70   Pulse (!) 120   Temp 36.6 °C (97.8 °F)   Resp 18   Wt 58.8 kg (129 lb 9.6 oz)   BMI 19.71 kg/m²   Constitutional: Uncomfortable with movement, otherwise in no acute distress  Cardiovascular: Tachycardic rate, reg rhythm, normal S1/S2, no murmurs noted  Pulmonary: Clear to auscultation b/l, no wheezes/crackles/rhonchi, no increased work of breathing  GI: Soft, non-tender, non-distended, normoactive bowel sounds  Lower extremities: Warm and well perfused. L>R ankle swelling, tenderness to palpation on L lateral malleolus, pain with active and passive ROM on inversion/eversion    Lab Results   Component Value Date    WBC 13.5 (H) 09/09/2024    HGB 11.4 (L) 09/09/2024    HCT 34.2 (L) 09/09/2024    MCV 90 09/09/2024     09/09/2024     Lab Results   Component Value Date    ALT 5 (L) 09/07/2024    AST  12 09/07/2024    ALKPHOS 61 09/07/2024    BILITOT 0.5 09/07/2024       === 09/07/24 ===    CT ABDOMEN PELVIS WO IV CONTRAST    - Impression -  There is colonic wall thickening extending from the proximal  ascending colon to the distal descending colon with mild pericolonic  inflammatory stranding. Findings concerning for  infectious/inflammatory colitis. No pneumatosis or portal venous gas.  No evidence for free air as clinically question. There is asymmetric  soft tissue thickening along the right medial intergluteal fold with  question some subtle adjacent stranding. Evaluation is limited on  this unenhanced CT. Given patient's history of Crohn's disease  findings concerning for a fistulous tract with developing phlegmonous  changes. Correlate with symptomatology and dedicated exam.    Additional findings as described above.    MACRO:  None    Signed by: Lucía Vasquez 9/8/2024 2:33 AM  Dictation workstation:   [id number]      ASSESSMENT  # Crohn's disease, colitis with perianal fistula-Previously on weekly Humira, stopped 1/2024 due to compliance issues. Recent hospitalization for CD flare c/b perirectal abscess s/p I&D with Colorectal Surgery while admitted. MRI Pelvis c/f fistulous connection. Colonoscopy during admission with significant activity throughout colon and TI, sparing rectum. Discharged on prednisone taper, now down to 5mg daily.     Currently with minimal abdominal sx, but continues with significant joint EIM w/ disease largely manifesting as sacroiliitis and peripheral arthritis.  Discussed treatment options with patient and he wishes to continue/restart adalimumab therapy.  He says that he feels well on this treatment by enlarge with minimal symptoms.  We will go ahead and get him approved for adalimumab and reinduce with 160/80 followed by 40 mg every other week.  Of note, he was previously on weekly Humira to control joint symptoms, but we will have to start with every 14-day  treatment.    Since he is so symptomatic with his joint symptoms since tapering prednisone down to 10 mg daily, we will put him back up to 40 mg of prednisone for 1 week, then 30 mg for a week, then 20 mg for a week, then taper by 5 mg/week.  This would allow us to get him back on Humira while controlling some of his most bothersome symptoms.  We will have him follow-up in 5 weeks to make sure that he is improving on Humira therapy.      PLAN  1) Will reload with Humira 160mg followed by 80mg 2w later, then 40mg 2weeks after that and continuing on 40mg q2w.   2) Will plan to see back in 4-5 weeks to asses how Humira is working q2w as was previously weekly.   3) To bridge until Humira reaches therapeutic levels, will increase Prednisone back to 40mg x7d followed by 30mg x7d, 20mg x7d, 15mg x7d, 10mg x7d, 5mg x7d  4) contact office if symptoms worsen

## 2024-10-15 ENCOUNTER — OFFICE VISIT (OUTPATIENT)
Dept: GASTROENTEROLOGY | Facility: HOSPITAL | Age: 29
End: 2024-10-15
Payer: COMMERCIAL

## 2024-10-15 VITALS
WEIGHT: 129.6 LBS | BODY MASS INDEX: 19.71 KG/M2 | SYSTOLIC BLOOD PRESSURE: 112 MMHG | RESPIRATION RATE: 18 BRPM | DIASTOLIC BLOOD PRESSURE: 70 MMHG | TEMPERATURE: 97.8 F | HEART RATE: 120 BPM

## 2024-10-15 DIAGNOSIS — K50.914: Primary | ICD-10-CM

## 2024-10-15 PROCEDURE — RXMED WILLOW AMBULATORY MEDICATION CHARGE

## 2024-10-15 PROCEDURE — 99214 OFFICE O/P EST MOD 30 MIN: CPT | Performed by: INTERNAL MEDICINE

## 2024-10-15 RX ORDER — PREDNISONE 10 MG/1
TABLET ORAL
Qty: 112 TABLET | Refills: 1 | Status: SHIPPED | OUTPATIENT
Start: 2024-10-15 | End: 2024-11-15

## 2024-10-15 ASSESSMENT — PAIN SCALES - GENERAL: PAINLEVEL: 10-WORST PAIN EVER

## 2024-10-15 NOTE — PATIENT INSTRUCTIONS
Thank you for coming in for a follow-up evaluation today.  As we discussed, we will keep you on Humira.  Since you have been off of it for 9 months, we will need to reinduce you with Humira.  Since your joints are worsened again, we will increase your prednisone until we get you back on Humira dosing.  As reviewed today:    1) increase prednisone back to 40 mg (4 tablets) once daily for 1 week, then lowered to 3 tablets for 1 week, then 2 tablets for 1 week, then lower by 1/2 tablet every week until off prednisone  2) we will get insurance approval for Humira (or a biosimilar medication).  He will start with 160 mg followed 2 weeks later by 80 mg followed 2 weeks after that by 40 mg every 2 weeks.  3) follow-up with us in the office on 11/12/24 at 9 AM to make sure that things are improving; if you are not doing better, we can move to weekly Humira at that time  4) contact the office with worsening symptoms

## 2024-10-16 ENCOUNTER — PHARMACY VISIT (OUTPATIENT)
Dept: PHARMACY | Facility: CLINIC | Age: 29
End: 2024-10-16
Payer: MEDICAID

## 2024-10-24 ENCOUNTER — APPOINTMENT (OUTPATIENT)
Dept: SURGERY | Facility: CLINIC | Age: 29
End: 2024-10-24
Payer: COMMERCIAL

## 2024-11-08 ENCOUNTER — PHARMACY VISIT (OUTPATIENT)
Dept: PHARMACY | Facility: CLINIC | Age: 29
End: 2024-11-08
Payer: MEDICAID

## 2024-11-08 PROCEDURE — RXMED WILLOW AMBULATORY MEDICATION CHARGE

## 2024-11-12 ENCOUNTER — APPOINTMENT (OUTPATIENT)
Dept: PRIMARY CARE | Facility: CLINIC | Age: 29
End: 2024-11-12
Payer: COMMERCIAL

## 2024-12-03 PROCEDURE — RXMED WILLOW AMBULATORY MEDICATION CHARGE

## 2024-12-04 ENCOUNTER — PHARMACY VISIT (OUTPATIENT)
Dept: PHARMACY | Facility: CLINIC | Age: 29
End: 2024-12-04
Payer: MEDICAID

## 2025-01-02 PROCEDURE — RXMED WILLOW AMBULATORY MEDICATION CHARGE

## 2025-01-03 ENCOUNTER — PHARMACY VISIT (OUTPATIENT)
Dept: PHARMACY | Facility: CLINIC | Age: 30
End: 2025-01-03
Payer: MEDICAID

## 2025-01-08 ENCOUNTER — SPECIALTY PHARMACY (OUTPATIENT)
Dept: PHARMACY | Facility: CLINIC | Age: 30
End: 2025-01-08

## 2025-01-08 ENCOUNTER — TELEPHONE (OUTPATIENT)
Dept: GASTROENTEROLOGY | Facility: HOSPITAL | Age: 30
End: 2025-01-08
Payer: COMMERCIAL

## 2025-01-08 DIAGNOSIS — K50.914: Primary | ICD-10-CM

## 2025-01-08 RX ORDER — ADALIMUMAB 80MG/0.8ML
KIT SUBCUTANEOUS
Qty: 3 EACH | Refills: 0 | Status: SHIPPED | OUTPATIENT
Start: 2025-01-08

## 2025-01-08 NOTE — TELEPHONE ENCOUNTER
Patient reached out regarding Humira prescription. He stated he has not received any information on it or the prescription itself. Patients best call back is [phone]

## 2025-01-09 ENCOUNTER — APPOINTMENT (OUTPATIENT)
Dept: SURGERY | Facility: CLINIC | Age: 30
End: 2025-01-09
Payer: COMMERCIAL

## 2025-01-09 DIAGNOSIS — K50.914: ICD-10-CM

## 2025-01-09 PROCEDURE — RXMED WILLOW AMBULATORY MEDICATION CHARGE

## 2025-01-09 RX ORDER — PREDNISONE 10 MG/1
TABLET ORAL
Qty: 112 TABLET | Refills: 1 | Status: SHIPPED | OUTPATIENT
Start: 2025-01-09 | End: 2025-01-19

## 2025-01-15 ENCOUNTER — SPECIALTY PHARMACY (OUTPATIENT)
Dept: PHARMACY | Facility: CLINIC | Age: 30
End: 2025-01-15

## 2025-01-16 ENCOUNTER — PHARMACY VISIT (OUTPATIENT)
Dept: PHARMACY | Facility: CLINIC | Age: 30
End: 2025-01-16
Payer: MEDICAID

## 2025-01-17 ENCOUNTER — SPECIALTY PHARMACY (OUTPATIENT)
Dept: PHARMACY | Facility: CLINIC | Age: 30
End: 2025-01-17

## 2025-01-17 NOTE — PROGRESS NOTES
Patient declined pharmacist offer to  as they feel comfortable with their medication. This is not a new medication for patient, has taken it before and is restarting load after gap in therapy.     Patient was advised of [facility]'s dispensing process, refill timeline, contact information, and patient management follow up. Encouraged patient to reach out with any questions or concerns.

## 2025-02-07 ENCOUNTER — APPOINTMENT (OUTPATIENT)
Dept: GASTROENTEROLOGY | Facility: CLINIC | Age: 30
End: 2025-02-07
Payer: COMMERCIAL

## 2025-02-13 ENCOUNTER — SPECIALTY PHARMACY (OUTPATIENT)
Dept: PHARMACY | Facility: CLINIC | Age: 30
End: 2025-02-13

## 2025-02-13 PROCEDURE — RXMED WILLOW AMBULATORY MEDICATION CHARGE

## 2025-02-14 ENCOUNTER — PHARMACY VISIT (OUTPATIENT)
Dept: PHARMACY | Facility: CLINIC | Age: 30
End: 2025-02-14
Payer: MEDICAID

## 2025-02-27 ENCOUNTER — APPOINTMENT (OUTPATIENT)
Dept: SURGERY | Facility: CLINIC | Age: 30
End: 2025-02-27
Payer: COMMERCIAL

## 2025-03-31 ENCOUNTER — SPECIALTY PHARMACY (OUTPATIENT)
Dept: PHARMACY | Facility: CLINIC | Age: 30
End: 2025-03-31

## 2025-05-13 ENCOUNTER — APPOINTMENT (OUTPATIENT)
Dept: GASTROENTEROLOGY | Facility: HOSPITAL | Age: 30
End: 2025-05-13
Payer: COMMERCIAL

## 2025-05-22 ENCOUNTER — TELEPHONE (OUTPATIENT)
Dept: GASTROENTEROLOGY | Facility: HOSPITAL | Age: 30
End: 2025-05-22
Payer: COMMERCIAL

## 2025-06-04 ENCOUNTER — SPECIALTY PHARMACY (OUTPATIENT)
Dept: PHARMACY | Facility: CLINIC | Age: 30
End: 2025-06-04

## 2025-06-09 ENCOUNTER — SPECIALTY PHARMACY (OUTPATIENT)
Dept: PHARMACY | Facility: CLINIC | Age: 30
End: 2025-06-09

## 2025-06-11 ENCOUNTER — TELEPHONE (OUTPATIENT)
Dept: GASTROENTEROLOGY | Facility: HOSPITAL | Age: 30
End: 2025-06-11
Payer: COMMERCIAL

## 2025-06-11 NOTE — TELEPHONE ENCOUNTER
Contacted patient regarding scheduling an follow-up appointment. No answer, unable to leave voicemail.

## 2025-06-24 ENCOUNTER — SPECIALTY PHARMACY (OUTPATIENT)
Dept: PHARMACY | Facility: CLINIC | Age: 30
End: 2025-06-24

## 2025-06-24 PROCEDURE — RXMED WILLOW AMBULATORY MEDICATION CHARGE

## 2025-06-26 ENCOUNTER — PHARMACY VISIT (OUTPATIENT)
Dept: PHARMACY | Facility: CLINIC | Age: 30
End: 2025-06-26
Payer: MEDICAID

## 2025-07-25 ENCOUNTER — SPECIALTY PHARMACY (OUTPATIENT)
Dept: PHARMACY | Facility: CLINIC | Age: 30
End: 2025-07-25

## 2025-07-25 PROCEDURE — RXMED WILLOW AMBULATORY MEDICATION CHARGE

## 2025-07-28 ENCOUNTER — PHARMACY VISIT (OUTPATIENT)
Dept: PHARMACY | Facility: CLINIC | Age: 30
End: 2025-07-28
Payer: MEDICAID

## 2025-08-29 ENCOUNTER — SPECIALTY PHARMACY (OUTPATIENT)
Dept: PHARMACY | Facility: CLINIC | Age: 30
End: 2025-08-29

## 2025-09-04 ENCOUNTER — SPECIALTY PHARMACY (OUTPATIENT)
Dept: PHARMACY | Facility: CLINIC | Age: 30
End: 2025-09-04